# Patient Record
Sex: MALE | Race: WHITE | Employment: FULL TIME | URBAN - METROPOLITAN AREA
[De-identification: names, ages, dates, MRNs, and addresses within clinical notes are randomized per-mention and may not be internally consistent; named-entity substitution may affect disease eponyms.]

---

## 2017-05-25 ENCOUNTER — OFFICE VISIT (OUTPATIENT)
Dept: INTERNAL MEDICINE CLINIC | Facility: CLINIC | Age: 52
End: 2017-05-25

## 2017-05-25 VITALS
SYSTOLIC BLOOD PRESSURE: 119 MMHG | DIASTOLIC BLOOD PRESSURE: 83 MMHG | BODY MASS INDEX: 31 KG/M2 | TEMPERATURE: 98 F | HEART RATE: 87 BPM | WEIGHT: 193.63 LBS

## 2017-05-25 DIAGNOSIS — Z85.820 HISTORY OF MELANOMA: ICD-10-CM

## 2017-05-25 DIAGNOSIS — K21.9 GASTROESOPHAGEAL REFLUX DISEASE, ESOPHAGITIS PRESENCE NOT SPECIFIED: Primary | ICD-10-CM

## 2017-05-25 DIAGNOSIS — R22.2 MASS IN CHEST: ICD-10-CM

## 2017-05-25 PROCEDURE — 99212 OFFICE O/P EST SF 10 MIN: CPT | Performed by: INTERNAL MEDICINE

## 2017-05-25 PROCEDURE — 99214 OFFICE O/P EST MOD 30 MIN: CPT | Performed by: INTERNAL MEDICINE

## 2017-05-25 RX ORDER — LAMOTRIGINE 200 MG/1
800 TABLET ORAL DAILY
COMMUNITY
Start: 2017-05-19 | End: 2017-11-09

## 2017-05-25 NOTE — PROGRESS NOTES
HPI:    Patient ID: Jessica Gaines is a 54year old male. Pt presents to clinic today for a lump under the sternum and GERD. Pt has hx of skin cancer- melanoma when pt was 15 y/o. Pt last saw dermatologist 10 years ago. Pt has been taking multivitamins. atraumatic. Eyes: EOM are normal.   Neck: Normal range of motion. Cardiovascular: Normal rate, regular rhythm and normal heart sounds. Exam reveals no gallop and no friction rub. No murmur heard.   Pulmonary/Chest: Effort normal. No respiratory dist [id number]  By signing my name below, Yereld Maria Ines,  attest that this documentation has been prepared under the direction and in the presence of Sebastien Dawkins MD.   Electronically Signed: Emeli Woo, 5/25/2017, 4:42 PM.      Guy Ferguson MD,  p

## 2017-11-09 ENCOUNTER — LAB ENCOUNTER (OUTPATIENT)
Dept: LAB | Facility: HOSPITAL | Age: 52
End: 2017-11-09
Attending: Other
Payer: COMMERCIAL

## 2017-11-09 ENCOUNTER — OFFICE VISIT (OUTPATIENT)
Dept: NEUROLOGY | Facility: CLINIC | Age: 52
End: 2017-11-09

## 2017-11-09 VITALS
BODY MASS INDEX: 30.53 KG/M2 | HEIGHT: 66 IN | DIASTOLIC BLOOD PRESSURE: 70 MMHG | HEART RATE: 80 BPM | RESPIRATION RATE: 16 BRPM | SYSTOLIC BLOOD PRESSURE: 118 MMHG | WEIGHT: 190 LBS

## 2017-11-09 DIAGNOSIS — G40.909 SEIZURE DISORDER (HCC): Primary | ICD-10-CM

## 2017-11-09 DIAGNOSIS — G40.909 SEIZURE DISORDER (HCC): ICD-10-CM

## 2017-11-09 PROCEDURE — 99213 OFFICE O/P EST LOW 20 MIN: CPT | Performed by: OTHER

## 2017-11-09 PROCEDURE — 84450 TRANSFERASE (AST) (SGOT): CPT

## 2017-11-09 PROCEDURE — 80175 DRUG SCREEN QUAN LAMOTRIGINE: CPT

## 2017-11-09 PROCEDURE — 85025 COMPLETE CBC W/AUTO DIFF WBC: CPT

## 2017-11-09 PROCEDURE — 36415 COLL VENOUS BLD VENIPUNCTURE: CPT

## 2017-11-09 RX ORDER — LAMOTRIGINE 200 MG/1
400 TABLET ORAL 2 TIMES DAILY
Qty: 360 TABLET | Refills: 3 | Status: SHIPPED | OUTPATIENT
Start: 2017-11-09 | End: 2018-08-08

## 2017-11-09 RX ORDER — MULTIVIT-MIN/IRON FUM/FOLIC AC 7.5 MG-4
1 TABLET ORAL DAILY
COMMUNITY
End: 2019-10-04

## 2017-11-09 NOTE — PROGRESS NOTES
Melanie Rai : 1965     HPI:   Patient presents with:  Seizures: LOV: 16. Patient is here for a f/u on seizures. Pt states he has been doing ok. Pt states most recent seizure was 2017.  Pt is currently taking Lamotrigine 200 mg 4 tabs bari History:  Social History    Marital status:              Spouse name:                       Years of education:                 Number of children:               Social History Main Topics    Smoking status: Never Smoker without atrophy. Motor: 5/5 strength in the upper and lower extremities. Tone normal. No pronator drift . Coordination: Finger to nose, heel to shin intact bilaterally. Gait: Narrow based, negative Romberg’s sign. Can stand on heels and toes.

## 2017-11-20 ENCOUNTER — TELEPHONE (OUTPATIENT)
Dept: NEUROLOGY | Facility: CLINIC | Age: 52
End: 2017-11-20

## 2018-08-06 ENCOUNTER — NURSE TRIAGE (OUTPATIENT)
Dept: OTHER | Age: 53
End: 2018-08-06

## 2018-08-06 ENCOUNTER — OFFICE VISIT (OUTPATIENT)
Dept: FAMILY MEDICINE CLINIC | Facility: CLINIC | Age: 53
End: 2018-08-06

## 2018-08-06 ENCOUNTER — OFFICE VISIT (OUTPATIENT)
Dept: INTERNAL MEDICINE CLINIC | Facility: CLINIC | Age: 53
End: 2018-08-06
Payer: MEDICAID

## 2018-08-06 VITALS
WEIGHT: 198 LBS | HEIGHT: 66 IN | HEART RATE: 78 BPM | DIASTOLIC BLOOD PRESSURE: 85 MMHG | SYSTOLIC BLOOD PRESSURE: 120 MMHG | RESPIRATION RATE: 12 BRPM | BODY MASS INDEX: 31.82 KG/M2 | TEMPERATURE: 96 F

## 2018-08-06 DIAGNOSIS — S56.911A MUSCLE STRAIN OF RIGHT FOREARM, INITIAL ENCOUNTER: ICD-10-CM

## 2018-08-06 DIAGNOSIS — J06.9 VIRAL UPPER RESPIRATORY TRACT INFECTION: Primary | ICD-10-CM

## 2018-08-06 DIAGNOSIS — Z02.9 ENCOUNTERS FOR ADMINISTRATIVE PURPOSE: Primary | ICD-10-CM

## 2018-08-06 PROCEDURE — 99214 OFFICE O/P EST MOD 30 MIN: CPT | Performed by: INTERNAL MEDICINE

## 2018-08-06 PROCEDURE — 99212 OFFICE O/P EST SF 10 MIN: CPT | Performed by: INTERNAL MEDICINE

## 2018-08-06 RX ORDER — LAMOTRIGINE 200 MG/1
2 TABLET ORAL 2 TIMES DAILY
Refills: 1 | COMMUNITY
Start: 2018-07-23 | End: 2018-11-07

## 2018-08-06 NOTE — PROGRESS NOTES
HPI:    Patient ID: Mynor Castellano is a 50year old male. Headache    This is a new problem. The current episode started today. The problem occurs intermittently. The problem has been unchanged.  The pain is located in the bilateral, parietal and occipit and vomiting. Musculoskeletal: Positive for myalgias and stiffness. Skin: Negative for rash. Neurological: Positive for headaches. Negative for syncope, weakness and numbness.             Current Outpatient Prescriptions:  lamoTRIgine 200 MG Oral Tab and no deformity. Right hand: Normal.   Lymphadenopathy:     He has no cervical adenopathy. Neurological: He is alert. He has normal reflexes. He displays normal reflexes. No cranial nerve deficit. He exhibits normal muscle tone.  Coordination norm

## 2018-08-06 NOTE — PROGRESS NOTES
Sherin Morgan is a 50year old male who presents to [facility] with c/o headache (he feels is tension-related), intermittent blurred vision, and RT forearm pain which he feels is unrelated.   Pt reports all symptoms began this morning and he needed to leave work

## 2018-08-06 NOTE — TELEPHONE ENCOUNTER
Action Requested: Summary for Provider     []  Critical Lab, Recommendations Needed  [] Need Additional Advice  []   FYI    []   Need Orders  [] Need Medications Sent to Pharmacy  []  Other     SUMMARY: appt made today with Dr. Madina Garcia. ADO.     Patient

## 2018-08-08 RX ORDER — LAMOTRIGINE 200 MG/1
400 TABLET ORAL 2 TIMES DAILY
Qty: 360 TABLET | Refills: 0 | Status: SHIPPED | OUTPATIENT
Start: 2018-08-08 | End: 2018-11-06

## 2018-08-08 NOTE — TELEPHONE ENCOUNTER
Refill request for lamotrigine 200 mg, 2 po BID, #360, no refills    LOV: 11/9/17  NOV: 9/13/18 with Dr. Eun Weldon

## 2018-11-03 ENCOUNTER — NURSE TRIAGE (OUTPATIENT)
Dept: INTERNAL MEDICINE CLINIC | Facility: CLINIC | Age: 53
End: 2018-11-03

## 2018-11-03 ENCOUNTER — HOSPITAL ENCOUNTER (OUTPATIENT)
Age: 53
Discharge: HOME | End: 2018-11-03
Attending: EMERGENCY MEDICINE
Payer: COMMERCIAL

## 2018-11-03 ENCOUNTER — APPOINTMENT (OUTPATIENT)
Dept: GENERAL RADIOLOGY | Age: 53
End: 2018-11-03
Attending: EMERGENCY MEDICINE
Payer: COMMERCIAL

## 2018-11-03 VITALS
HEART RATE: 80 BPM | SYSTOLIC BLOOD PRESSURE: 124 MMHG | RESPIRATION RATE: 16 BRPM | WEIGHT: 185 LBS | DIASTOLIC BLOOD PRESSURE: 79 MMHG | BODY MASS INDEX: 30 KG/M2 | TEMPERATURE: 99 F | OXYGEN SATURATION: 100 %

## 2018-11-03 DIAGNOSIS — G56.01 CARPAL TUNNEL SYNDROME OF RIGHT WRIST: Primary | ICD-10-CM

## 2018-11-03 PROCEDURE — 99213 OFFICE O/P EST LOW 20 MIN: CPT

## 2018-11-03 PROCEDURE — 72040 X-RAY EXAM NECK SPINE 2-3 VW: CPT | Performed by: EMERGENCY MEDICINE

## 2018-11-03 PROCEDURE — 99203 OFFICE O/P NEW LOW 30 MIN: CPT

## 2018-11-03 NOTE — TELEPHONE ENCOUNTER
Action Requested: Summary for Provider     []  Critical Lab, Recommendations Needed  [] Need Additional Advice  []   FYI    []   Need Orders  [] Need Medications Sent to Pharmacy  []  Other     SUMMARY: Dr Madina Garcia, patient c/o numbness in the fingers an

## 2018-11-03 NOTE — ED PROVIDER NOTES
Patient Seen in: [facility] In [address] [place] [address]    History   Patient presents with:  Upper Extremity Injury (musculoskeletal)    Stated Complaint: r arm tingling    HPI    Patient is a 45-year-old male who presents to the urgent care with a Eyes: Conjunctivae and EOM are normal. Pupils are equal, round, and reactive to light. Neck: Neck supple. Cardiovascular: Normal rate, regular rhythm and normal heart sounds. Pulmonary/Chest: Effort normal.   Musculoskeletal: Normal range of motion.

## 2018-11-03 NOTE — TELEPHONE ENCOUNTER
Patient contacted, relayed Dr Elvira Umanzor message, offered [facility] today 9-4:30 or appt later this week.  Patient chose to go to IC

## 2018-11-03 NOTE — ED NOTES
velcro splint applied to wrist. Motrin or tylenol for pain an ds welling list of hand provided and to guido and make appointment-

## 2018-11-03 NOTE — ED INITIAL ASSESSMENT (HPI)
Tingling in rt arm for weeks and now in past 24 hours peterson side of hand and wrist are numb. No trauma no brain injury. Does have seizures from trauma fall as a child.

## 2018-11-05 NOTE — TELEPHONE ENCOUNTER
Pt seen at [facility] 11/3/18 with recommendations to see Dr Luisana Lima as f/u. CSS please contact pt to schedule f/u OV.

## 2018-11-07 ENCOUNTER — OFFICE VISIT (OUTPATIENT)
Dept: NEUROLOGY | Facility: CLINIC | Age: 53
End: 2018-11-07
Payer: COMMERCIAL

## 2018-11-07 ENCOUNTER — APPOINTMENT (OUTPATIENT)
Dept: LAB | Facility: HOSPITAL | Age: 53
End: 2018-11-07
Attending: Other
Payer: COMMERCIAL

## 2018-11-07 VITALS
BODY MASS INDEX: 29.73 KG/M2 | SYSTOLIC BLOOD PRESSURE: 108 MMHG | DIASTOLIC BLOOD PRESSURE: 88 MMHG | HEART RATE: 95 BPM | HEIGHT: 66 IN | WEIGHT: 185 LBS

## 2018-11-07 DIAGNOSIS — G40.909 SEIZURE DISORDER (HCC): Primary | ICD-10-CM

## 2018-11-07 DIAGNOSIS — G40.909 SEIZURE DISORDER (HCC): ICD-10-CM

## 2018-11-07 PROCEDURE — 85025 COMPLETE CBC W/AUTO DIFF WBC: CPT | Performed by: OTHER

## 2018-11-07 PROCEDURE — 84450 TRANSFERASE (AST) (SGOT): CPT

## 2018-11-07 PROCEDURE — 84460 ALANINE AMINO (ALT) (SGPT): CPT

## 2018-11-07 PROCEDURE — 99214 OFFICE O/P EST MOD 30 MIN: CPT | Performed by: OTHER

## 2018-11-07 PROCEDURE — 36415 COLL VENOUS BLD VENIPUNCTURE: CPT | Performed by: OTHER

## 2018-11-07 RX ORDER — LANSOPRAZOLE 30 MG/1
30 CAPSULE, DELAYED RELEASE ORAL DAILY
COMMUNITY
End: 2020-05-15

## 2018-11-07 RX ORDER — LAMOTRIGINE 200 MG/1
200 TABLET ORAL 2 TIMES DAILY
Qty: 360 TABLET | Refills: 3 | Status: SHIPPED | OUTPATIENT
Start: 2018-11-07 | End: 2019-11-14

## 2018-11-07 NOTE — PROGRESS NOTES
The pleasure assuming the neurological care of Luis Munoz. He had a partial complex seizure in March. He states he has 1-2 partial complex seizures per year. Blood tests, Lamictal level last year reviewed. He works as a  in office building.   He was re

## 2018-11-09 NOTE — TELEPHONE ENCOUNTER
MICHAEL message    Spoke to pt and stts that he was advised by urgent care to see a hand specialist instead of PCP and he is already scheduled with the specialist. (Routing comment)

## 2019-01-17 ENCOUNTER — TELEPHONE (OUTPATIENT)
Dept: NEUROLOGY | Facility: CLINIC | Age: 54
End: 2019-01-17

## 2019-04-03 NOTE — H&P
[facility] - Gastroenterology                                                                                                  Clinic History and Physical 1 tablet (200 mg total) by mouth 2 (two) times daily. Disp: 360 tablet Rfl: 3   Multiple Vitamins-Minerals (MULTI-VITAMIN/MINERALS) Oral Tab Take 1 tablet by mouth daily.  Disp:  Rfl:      Allergies:  No Known Allergies    ROS:   all 10 systems were reviewe proceed with colonoscopy with intervention [i.e. polypectomy, control of bleeding, dilatation, etc.] as indicated. Orders This Visit:  No orders of the defined types were placed in this encounter.       Meds This Visit:  Requested Prescriptions      No p

## 2019-04-04 ENCOUNTER — OFFICE VISIT (OUTPATIENT)
Dept: GASTROENTEROLOGY | Facility: CLINIC | Age: 54
End: 2019-04-04
Payer: MEDICAID

## 2019-04-04 VITALS
WEIGHT: 197.81 LBS | HEART RATE: 84 BPM | HEIGHT: 66 IN | DIASTOLIC BLOOD PRESSURE: 80 MMHG | SYSTOLIC BLOOD PRESSURE: 117 MMHG | BODY MASS INDEX: 31.79 KG/M2

## 2019-04-04 DIAGNOSIS — K21.9 GASTROESOPHAGEAL REFLUX DISEASE, ESOPHAGITIS PRESENCE NOT SPECIFIED: ICD-10-CM

## 2019-04-04 DIAGNOSIS — Z12.12 SCREENING FOR COLORECTAL CANCER: Primary | ICD-10-CM

## 2019-04-04 DIAGNOSIS — Z12.11 SCREENING FOR COLORECTAL CANCER: Primary | ICD-10-CM

## 2019-04-04 PROCEDURE — 99203 OFFICE O/P NEW LOW 30 MIN: CPT | Performed by: INTERNAL MEDICINE

## 2019-04-04 NOTE — PATIENT INSTRUCTIONS
1. Schedule colonoscopy with IV/conscious sedation at the hospital with Dr. Ankit Obrien    2.  bowel prep from pharmacy (split Suprep )    3. Continue all medications for procedure    4. Read all bowel prep instructions carefully    5.  AVOID seeds, nu

## 2019-04-05 ENCOUNTER — TELEPHONE (OUTPATIENT)
Dept: GASTROENTEROLOGY | Facility: CLINIC | Age: 54
End: 2019-04-05

## 2019-04-05 DIAGNOSIS — Z12.12 ENCOUNTER FOR COLORECTAL CANCER SCREENING: Primary | ICD-10-CM

## 2019-04-05 DIAGNOSIS — Z12.11 ENCOUNTER FOR COLORECTAL CANCER SCREENING: Primary | ICD-10-CM

## 2019-04-05 NOTE — TELEPHONE ENCOUNTER
Scheduled for:  Colonoscopy 77[address]  Provider Name:   Date:  4/22/19  Location:  [facility]  Sedation:  Ivcs  Time:  0815 / Arrival 0715   Prep: Split dose Trilyte or Suprep  Meds/Allergies Reconciled?:  Physician reviewed  Diagnosis with codes: Colorectal can

## 2019-04-16 ENCOUNTER — TELEPHONE (OUTPATIENT)
Dept: GASTROENTEROLOGY | Facility: CLINIC | Age: 54
End: 2019-04-16

## 2019-04-16 DIAGNOSIS — Z12.11 COLON CANCER SCREENING: Primary | ICD-10-CM

## 2019-04-16 NOTE — TELEPHONE ENCOUNTER
Rescheduled for:  Colonoscopy - [facility]  Provider Name:  Dr. Barbara Onofre  Date:  FROM - 4/22/19              TO - 5/20/19  Location:  [facility]  Sedation:  IV  Time:  FROM - 8:15 am         TO - 11:15 am (pt is aware to arrive at 10:15 am)  Prep:  Trilyte, Prep inst

## 2019-04-16 NOTE — TELEPHONE ENCOUNTER
Pt calling to reschedule CLN 4/22/19 do to going out of town. Pt aware of at least 72hr call back time.  Please call [phone]

## 2019-05-15 ENCOUNTER — TELEPHONE (OUTPATIENT)
Dept: GASTROENTEROLOGY | Facility: CLINIC | Age: 54
End: 2019-05-15

## 2019-05-15 DIAGNOSIS — Z12.11 COLON CANCER SCREENING: Primary | ICD-10-CM

## 2019-05-15 NOTE — TELEPHONE ENCOUNTER
Scheduled for:  Colonoscopy 47299  Provider Name: Dr. Travis Cuellar  Date:  7/22/19  Location:  [facility]  Sedation:  IV  Time:   0830 (pt is aware to arrive at 0730)   Prep:  Trilyte, sent new prep via [employer]  Meds/Allergies Reconciled?:  Physician reviewed   Diag

## 2019-05-15 NOTE — TELEPHONE ENCOUNTER
Patient canceling his colonoscopy due to a new work schedule. Patient would like to reschedule. Appointment cancelled in [place] [address]. Surgical case request sent to endo. CHELO message sent to Dr. Scout Shelton. Message sent to GI schedulers.

## 2019-07-19 ENCOUNTER — PATIENT MESSAGE (OUTPATIENT)
Dept: GASTROENTEROLOGY | Facility: CLINIC | Age: 54
End: 2019-07-19

## 2019-07-20 NOTE — PROGRESS NOTES
E-prescribed Dawit Grace MD  [facility] - Gastroenterology  7/20/2019  10:58 AM

## 2019-07-22 ENCOUNTER — HOSPITAL ENCOUNTER (OUTPATIENT)
Facility: HOSPITAL | Age: 54
Setting detail: HOSPITAL OUTPATIENT SURGERY
Discharge: HOME | End: 2019-07-22
Attending: INTERNAL MEDICINE | Admitting: INTERNAL MEDICINE
Payer: MEDICAID

## 2019-07-22 DIAGNOSIS — Z12.11 COLON CANCER SCREENING: ICD-10-CM

## 2019-07-22 PROCEDURE — 45385 COLONOSCOPY W/LESION REMOVAL: CPT | Performed by: INTERNAL MEDICINE

## 2019-07-22 PROCEDURE — G0500 MOD SEDAT ENDO SERVICE >5YRS: HCPCS | Performed by: INTERNAL MEDICINE

## 2019-07-22 PROCEDURE — 0DBN8ZX EXCISION OF SIGMOID COLON, VIA NATURAL OR ARTIFICIAL OPENING ENDOSCOPIC, DIAGNOSTIC: ICD-10-PCS | Performed by: INTERNAL MEDICINE

## 2019-07-22 RX ORDER — MIDAZOLAM HYDROCHLORIDE 1 MG/ML
1 INJECTION INTRAMUSCULAR; INTRAVENOUS EVERY 5 MIN PRN
Status: DISCONTINUED | OUTPATIENT
Start: 2019-07-22 | End: 2019-07-22

## 2019-07-22 RX ORDER — MIDAZOLAM HYDROCHLORIDE 1 MG/ML
INJECTION INTRAMUSCULAR; INTRAVENOUS
Status: DISCONTINUED | OUTPATIENT
Start: 2019-07-22 | End: 2019-07-22

## 2019-07-22 RX ORDER — SODIUM CHLORIDE, SODIUM LACTATE, POTASSIUM CHLORIDE, CALCIUM CHLORIDE 600; 310; 30; 20 MG/100ML; MG/100ML; MG/100ML; MG/100ML
INJECTION, SOLUTION INTRAVENOUS CONTINUOUS
Status: DISCONTINUED | OUTPATIENT
Start: 2019-07-22 | End: 2019-07-22

## 2019-07-22 RX ORDER — SODIUM CHLORIDE 0.9 % (FLUSH) 0.9 %
10 SYRINGE (ML) INJECTION AS NEEDED
Status: DISCONTINUED | OUTPATIENT
Start: 2019-07-22 | End: 2019-07-22

## 2019-07-22 NOTE — OPERATIVE REPORT
Colonoscopy Report    Benito Crowic     1965 Age 48year old   PCP Rosie Parr MD Endoscopist Philly Cordova MD     Date of procedure: 19    Procedure: Colonoscopy w/ snare polypectomy    Pre-operative diagnosis: colorectal ca was then withdrawn and the endoscope was removed. The patient tolerated the procedure well. There were no immediate postoperative complications. The patient’s vital signs were monitored throughout the procedure and remained stable.     Estimated blood loss:

## 2019-07-22 NOTE — TELEPHONE ENCOUNTER
From: Melani Avalos  To: Yehuda Rodriguez MD  Sent: 7/19/2019 9:31 PM CDT  Subject: Prescription Question    My colonoscopy scheduled for Monday I was wondering when the prescription is going to be called in to [facility]

## 2019-07-22 NOTE — H&P
History & Physical Examination    Patient Name: Kraig Mari  MRN: [id number]  CSN: [id number]  YOB: 1965    Diagnosis: colorectal cancer screening      Medications Prior to Admission:  lansoprazole 30 MG Oral Capsule Delayed Release Take AM

## 2019-07-23 ENCOUNTER — TELEPHONE (OUTPATIENT)
Dept: GASTROENTEROLOGY | Facility: CLINIC | Age: 54
End: 2019-07-23

## 2019-07-23 VITALS
HEART RATE: 59 BPM | SYSTOLIC BLOOD PRESSURE: 111 MMHG | DIASTOLIC BLOOD PRESSURE: 87 MMHG | BODY MASS INDEX: 28.13 KG/M2 | HEIGHT: 66 IN | WEIGHT: 175 LBS | RESPIRATION RATE: 21 BRPM | OXYGEN SATURATION: 100 %

## 2019-07-23 NOTE — TELEPHONE ENCOUNTER
I mailed out colonoscopy results letter to pt  Updated health maintenance  Entered into 10 yr CLN recall  Recall colon in 10 years per.  Colon done 7/22/19    GI staff: please place recall in for colonoscopy in 10 years

## 2019-08-09 ENCOUNTER — OFFICE VISIT (OUTPATIENT)
Dept: INTERNAL MEDICINE CLINIC | Facility: CLINIC | Age: 54
End: 2019-08-09
Payer: MEDICAID

## 2019-08-09 ENCOUNTER — HOSPITAL ENCOUNTER (OUTPATIENT)
Dept: GENERAL RADIOLOGY | Age: 54
Discharge: HOME | End: 2019-08-09
Attending: INTERNAL MEDICINE
Payer: MEDICAID

## 2019-08-09 ENCOUNTER — LAB ENCOUNTER (OUTPATIENT)
Dept: LAB | Age: 54
End: 2019-08-09
Attending: INTERNAL MEDICINE
Payer: MEDICAID

## 2019-08-09 VITALS
DIASTOLIC BLOOD PRESSURE: 70 MMHG | HEART RATE: 62 BPM | SYSTOLIC BLOOD PRESSURE: 106 MMHG | TEMPERATURE: 96 F | RESPIRATION RATE: 12 BRPM | WEIGHT: 192 LBS | HEIGHT: 65.5 IN | BODY MASS INDEX: 31.6 KG/M2

## 2019-08-09 DIAGNOSIS — M25.571 ACUTE RIGHT ANKLE PAIN: ICD-10-CM

## 2019-08-09 DIAGNOSIS — K21.9 GASTROESOPHAGEAL REFLUX DISEASE, ESOPHAGITIS PRESENCE NOT SPECIFIED: ICD-10-CM

## 2019-08-09 DIAGNOSIS — Z00.00 ANNUAL PHYSICAL EXAM: Primary | ICD-10-CM

## 2019-08-09 DIAGNOSIS — G40.909 SEIZURE DISORDER (HCC): ICD-10-CM

## 2019-08-09 DIAGNOSIS — Z85.820 HISTORY OF MELANOMA: ICD-10-CM

## 2019-08-09 DIAGNOSIS — Z00.00 ANNUAL PHYSICAL EXAM: ICD-10-CM

## 2019-08-09 PROBLEM — S56.911A MUSCLE STRAIN OF RIGHT FOREARM: Status: RESOLVED | Noted: 2018-08-06 | Resolved: 2019-08-09

## 2019-08-09 PROBLEM — J06.9 VIRAL UPPER RESPIRATORY TRACT INFECTION: Status: RESOLVED | Noted: 2018-08-06 | Resolved: 2019-08-09

## 2019-08-09 LAB
ALBUMIN SERPL-MCNC: 3.7 G/DL (ref 3.4–5)
ALBUMIN/GLOB SERPL: 1.2 {RATIO} (ref 1–2)
ALP LIVER SERPL-CCNC: 81 U/L (ref 45–117)
ALT SERPL-CCNC: 31 U/L (ref 16–61)
ANION GAP SERPL CALC-SCNC: 5 MMOL/L (ref 0–18)
AST SERPL-CCNC: 23 U/L (ref 15–37)
BASOPHILS # BLD AUTO: 0.03 X10(3) UL (ref 0–0.2)
BASOPHILS NFR BLD AUTO: 0.7 %
BILIRUB SERPL-MCNC: 0.7 MG/DL (ref 0.1–2)
BUN BLD-MCNC: 14 MG/DL (ref 7–18)
BUN/CREAT SERPL: 15.4 (ref 10–20)
CALCIUM BLD-MCNC: 9 MG/DL (ref 8.5–10.1)
CHLORIDE SERPL-SCNC: 109 MMOL/L (ref 98–112)
CHOLEST SMN-MCNC: 167 MG/DL (ref ?–200)
CO2 SERPL-SCNC: 28 MMOL/L (ref 21–32)
COMPLEXED PSA SERPL-MCNC: 0.92 NG/ML (ref ?–4)
CREAT BLD-MCNC: 0.91 MG/DL (ref 0.7–1.3)
DEPRECATED RDW RBC AUTO: 43.8 FL (ref 35.1–46.3)
EOSINOPHIL # BLD AUTO: 0.15 X10(3) UL (ref 0–0.7)
EOSINOPHIL NFR BLD AUTO: 3.6 %
ERYTHROCYTE [DISTWIDTH] IN BLOOD BY AUTOMATED COUNT: 12.9 % (ref 11–15)
GLOBULIN PLAS-MCNC: 3.2 G/DL (ref 2.8–4.4)
GLUCOSE BLD-MCNC: 93 MG/DL (ref 70–99)
HCT VFR BLD AUTO: 43.7 % (ref 39–53)
HDLC SERPL-MCNC: 47 MG/DL (ref 40–59)
HGB BLD-MCNC: 14.7 G/DL (ref 13–17.5)
IMM GRANULOCYTES # BLD AUTO: 0.01 X10(3) UL (ref 0–1)
IMM GRANULOCYTES NFR BLD: 0.2 %
LDLC SERPL CALC-MCNC: 106 MG/DL (ref ?–100)
LYMPHOCYTES # BLD AUTO: 1.5 X10(3) UL (ref 1–4)
LYMPHOCYTES NFR BLD AUTO: 36.3 %
M PROTEIN MFR SERPL ELPH: 6.9 G/DL (ref 6.4–8.2)
MCH RBC QN AUTO: 31.2 PG (ref 26–34)
MCHC RBC AUTO-ENTMCNC: 33.6 G/DL (ref 31–37)
MCV RBC AUTO: 92.8 FL (ref 80–100)
MONOCYTES # BLD AUTO: 0.42 X10(3) UL (ref 0.1–1)
MONOCYTES NFR BLD AUTO: 10.2 %
NEUTROPHILS # BLD AUTO: 2.02 X10 (3) UL (ref 1.5–7.7)
NEUTROPHILS # BLD AUTO: 2.02 X10(3) UL (ref 1.5–7.7)
NEUTROPHILS NFR BLD AUTO: 49 %
NONHDLC SERPL-MCNC: 120 MG/DL (ref ?–130)
OSMOLALITY SERPL CALC.SUM OF ELEC: 294 MOSM/KG (ref 275–295)
PATIENT FASTING: YES
PATIENT FASTING: YES
PLATELET # BLD AUTO: 207 10(3)UL (ref 150–450)
POTASSIUM SERPL-SCNC: 4.4 MMOL/L (ref 3.5–5.1)
RBC # BLD AUTO: 4.71 X10(6)UL (ref 4.3–5.7)
SODIUM SERPL-SCNC: 142 MMOL/L (ref 136–145)
TRIGL SERPL-MCNC: 72 MG/DL (ref 30–149)
VLDLC SERPL CALC-MCNC: 14 MG/DL (ref 0–30)
WBC # BLD AUTO: 4.1 X10(3) UL (ref 4–11)

## 2019-08-09 PROCEDURE — 85025 COMPLETE CBC W/AUTO DIFF WBC: CPT

## 2019-08-09 PROCEDURE — 80053 COMPREHEN METABOLIC PANEL: CPT

## 2019-08-09 PROCEDURE — 80061 LIPID PANEL: CPT

## 2019-08-09 PROCEDURE — 36415 COLL VENOUS BLD VENIPUNCTURE: CPT

## 2019-08-09 PROCEDURE — 73610 X-RAY EXAM OF ANKLE: CPT | Performed by: INTERNAL MEDICINE

## 2019-08-09 PROCEDURE — 99396 PREV VISIT EST AGE 40-64: CPT | Performed by: INTERNAL MEDICINE

## 2019-08-09 NOTE — PROGRESS NOTES
HPI:    Patient ID: Fiona Mac is a 48year old male. Patient presents today for his annual physical.     Ankle Pain    The incident occurred more than 1 week ago (3 mos ago rolled his right ankle). The injury mechanism was an inversion injury.  Ramiro Montemayor discharge. Left eye exhibits no discharge. No scleral icterus. Neck: Normal range of motion. Neck supple. No JVD present. No thyromegaly present. Cardiovascular: Normal rate, regular rhythm, normal heart sounds and intact distal pulses.  Exam reveals no (Screening) [E]      Meds This Visit:  Requested Prescriptions      No prescriptions requested or ordered in this encounter       Imaging & Referrals:  DERM - INTERNAL  XR ANKLE (2 VIEW), RIGHT (CPT=73600)       #[id number]

## 2019-08-12 ENCOUNTER — TELEPHONE (OUTPATIENT)
Dept: INTERNAL MEDICINE CLINIC | Facility: CLINIC | Age: 54
End: 2019-08-12

## 2019-08-12 DIAGNOSIS — M25.571 ACUTE RIGHT ANKLE PAIN: Primary | ICD-10-CM

## 2019-10-04 ENCOUNTER — OFFICE VISIT (OUTPATIENT)
Dept: DERMATOLOGY CLINIC | Facility: CLINIC | Age: 54
End: 2019-10-04
Payer: MEDICAID

## 2019-10-04 DIAGNOSIS — Z85.820 PERSONAL HISTORY OF MALIGNANT MELANOMA OF SKIN: ICD-10-CM

## 2019-10-04 DIAGNOSIS — D22.9 MULTIPLE NEVI: Primary | ICD-10-CM

## 2019-10-04 DIAGNOSIS — D23.60 BENIGN NEOPLASM OF SKIN OF UPPER LIMB, INCLUDING SHOULDER, UNSPECIFIED LATERALITY: ICD-10-CM

## 2019-10-04 DIAGNOSIS — D23.30 BENIGN NEOPLASM OF SKIN OF FACE: ICD-10-CM

## 2019-10-04 DIAGNOSIS — D23.70 BENIGN NEOPLASM OF SKIN OF LOWER LIMB, INCLUDING HIP, UNSPECIFIED LATERALITY: ICD-10-CM

## 2019-10-04 DIAGNOSIS — D23.4 BENIGN NEOPLASM OF SCALP AND SKIN OF NECK: ICD-10-CM

## 2019-10-04 DIAGNOSIS — D23.5 BENIGN NEOPLASM OF SKIN OF TRUNK, EXCEPT SCROTUM: ICD-10-CM

## 2019-10-04 PROCEDURE — 99203 OFFICE O/P NEW LOW 30 MIN: CPT | Performed by: DERMATOLOGY

## 2019-10-14 NOTE — PROGRESS NOTES
Kraig Mari is a 48year old male. HPI:     CC:  Patient presents with:  Full Skin Exam: New pt presenting for full body skin exam. Pt has a personal hx of melanoma to L 5th finger (when pt was 14years old).          Allergies:  Patient has no known SURGERY UNLISTED      LRF tendon repair     Social History    Socioeconomic History      Marital status:       Spouse name: Not on file      Number of children: Not on file      Years of education: Not on file      Highest education level: Not on fi Self-Exams: Not Asked        Grew up on a farm: Not Asked        History of tanning: Not Asked        Outdoor occupation: Not Asked        Reaction to local anesthetic: No    Social History Narrative      Not on file    Family History   Problem Relation Ag scattered, cherry-red vascular papules scattered. See map today's date for lesions noted . Otherwise remarkable for lesions as noted on map.   See details of examination  See Assessment /Plan for additional history and physical exam also:    Assessm follow-up/ above. This note was generated using Dragon voice recognition software. Please contact me regarding any confusion resulting from errors in recognition.

## 2019-11-14 ENCOUNTER — OFFICE VISIT (OUTPATIENT)
Dept: NEUROLOGY | Facility: CLINIC | Age: 54
End: 2019-11-14
Payer: MEDICAID

## 2019-11-14 ENCOUNTER — APPOINTMENT (OUTPATIENT)
Dept: LAB | Age: 54
End: 2019-11-14
Attending: Other
Payer: MEDICAID

## 2019-11-14 VITALS
HEIGHT: 66 IN | SYSTOLIC BLOOD PRESSURE: 118 MMHG | RESPIRATION RATE: 16 BRPM | DIASTOLIC BLOOD PRESSURE: 72 MMHG | WEIGHT: 171 LBS | HEART RATE: 66 BPM | BODY MASS INDEX: 27.48 KG/M2

## 2019-11-14 DIAGNOSIS — R56.9 SEIZURE (HCC): Primary | ICD-10-CM

## 2019-11-14 DIAGNOSIS — R56.9 SEIZURE (HCC): ICD-10-CM

## 2019-11-14 PROCEDURE — 80175 DRUG SCREEN QUAN LAMOTRIGINE: CPT

## 2019-11-14 PROCEDURE — 99213 OFFICE O/P EST LOW 20 MIN: CPT | Performed by: OTHER

## 2019-11-14 PROCEDURE — 36415 COLL VENOUS BLD VENIPUNCTURE: CPT

## 2019-11-14 RX ORDER — LAMOTRIGINE 200 MG/1
200 TABLET ORAL 2 TIMES DAILY
Qty: 360 TABLET | Refills: 3 | Status: SHIPPED | OUTPATIENT
Start: 2019-11-14 | End: 2019-11-14

## 2019-11-14 RX ORDER — LAMOTRIGINE 200 MG/1
400 TABLET ORAL 2 TIMES DAILY
Qty: 360 TABLET | Refills: 3 | Status: SHIPPED | OUTPATIENT
Start: 2019-11-14 | End: 2019-11-22

## 2019-11-14 NOTE — PROGRESS NOTES
Mr Magdalene Hernandez, relates since August he has been having to 3 partial complex seizures, spacing out for several seconds per week. No secondary generalized tonic-clonic seizure. Prior to August she was having moderate to partial complex seizures per month.   He

## 2020-04-03 ENCOUNTER — TELEPHONE (OUTPATIENT)
Dept: INTERNAL MEDICINE CLINIC | Facility: CLINIC | Age: 55
End: 2020-04-03

## 2020-04-03 ENCOUNTER — NURSE TRIAGE (OUTPATIENT)
Dept: OTHER | Age: 55
End: 2020-04-03

## 2020-04-03 ENCOUNTER — OFFICE VISIT (OUTPATIENT)
Dept: INTERNAL MEDICINE CLINIC | Facility: CLINIC | Age: 55
End: 2020-04-03
Payer: MEDICAID

## 2020-04-03 VITALS
SYSTOLIC BLOOD PRESSURE: 129 MMHG | WEIGHT: 186 LBS | BODY MASS INDEX: 29.89 KG/M2 | HEART RATE: 75 BPM | DIASTOLIC BLOOD PRESSURE: 78 MMHG | HEIGHT: 66 IN | TEMPERATURE: 98 F

## 2020-04-03 DIAGNOSIS — N50.811 RIGHT TESTICULAR PAIN: Primary | ICD-10-CM

## 2020-04-03 PROCEDURE — 81003 URINALYSIS AUTO W/O SCOPE: CPT | Performed by: INTERNAL MEDICINE

## 2020-04-03 PROCEDURE — 99214 OFFICE O/P EST MOD 30 MIN: CPT | Performed by: INTERNAL MEDICINE

## 2020-04-03 NOTE — TELEPHONE ENCOUNTER
Action Requested: Summary for Provider     []  Critical Lab, Recommendations Needed  [x] Need Additional Advice  []   FYI    []   Need Orders  [] Need Medications Sent to Pharmacy  []  Other     SUMMARY:      Does patient need to be seen or have  telephone

## 2020-04-03 NOTE — TELEPHONE ENCOUNTER
S/W patient. Travel Screen and Covid-19 questions asked. Patient denies any travel this year in or out of the country. Denies cough, sore throat, fever, and/or any difficulty breathing.  Related Dr. Maria Fernanda Orozco message that he will see the patient today at

## 2020-04-03 NOTE — PROGRESS NOTES
HPI:    Patient ID: Fiona Mac is a 48year old male. Penis/Scrotum Problem   The patient's pertinent negatives include no penile discharge, penile pain, scrotal swelling or testicular pain. Primary symptoms comment: right testicular pain.  This is cooperative. Non-toxic appearance. He does not have a sickly appearance. He does not appear ill. No distress. HENT:   Head: Normocephalic and atraumatic.    Right Ear: External ear normal.   Left Ear: External ear normal.   Nose: Nose normal.   Mouth/Thr pain  (primary encounter diagnosis)  Plan: URINALYSIS, AUTO, W/O SCOPE        Genital/abdominal exam was normal. No tenderness on the testicles to suggest epididymitis nor orchitis.   We did do urine dip and showed no pyuria but moderate blood; no nitrite;

## 2020-04-03 NOTE — TELEPHONE ENCOUNTER
Bianka Goldman LPN called from Dr Booker Giang office and states she will contact pt and take care of below in a different message that she can type in.

## 2020-04-06 ENCOUNTER — TELEPHONE (OUTPATIENT)
Dept: INTERNAL MEDICINE CLINIC | Facility: CLINIC | Age: 55
End: 2020-04-06

## 2020-04-06 DIAGNOSIS — R31.29 MICROHEMATURIA: Primary | ICD-10-CM

## 2020-04-07 ENCOUNTER — TELEPHONE (OUTPATIENT)
Dept: OTHER | Age: 55
End: 2020-04-07

## 2020-04-07 NOTE — TELEPHONE ENCOUNTER
Verified name and . Results/recommendations reviewed with pt and pt verb understanding. Pt states will retrieve referral information from my chart.      Notes recorded by Charley Dunham MD on 2020 at 10:33 AM CDT  Notify pt; his urine cultur

## 2020-04-08 ENCOUNTER — TELEPHONE (OUTPATIENT)
Dept: SURGERY | Facility: CLINIC | Age: 55
End: 2020-04-08

## 2020-04-08 NOTE — TELEPHONE ENCOUNTER
Spoke with Patient Alberto Melgar, Mike's wife adv that due to the COVID-19 Crisis and CDC Recommendations we would  to assist in rescheduling his appointment.    She agreed to reschedule his appointment to 05/22/20

## 2020-05-14 ENCOUNTER — PATIENT MESSAGE (OUTPATIENT)
Dept: SURGERY | Facility: CLINIC | Age: 55
End: 2020-05-14

## 2020-05-14 NOTE — TELEPHONE ENCOUNTER
From: Benito Waters  To: Kevan Hill MD  Sent: 5/14/2020 10:14 AM CDT  Subject: Non-Urgent Medical Question    Ican do the video vist. I just wanted to let you know that once in a while my urine is brown and I don't know what that means.

## 2020-05-14 NOTE — TELEPHONE ENCOUNTER
RN called in response to his call that he is ok to have a video visit with MD.     Patient denies pain. Stream is strong. Denies frequency. No burning sensation. Patient is a new consult for urine discoloration.  Reports brown urine, rt testicular pain once

## 2020-05-14 NOTE — TELEPHONE ENCOUNTER
LMTCB    Also sent my chart message. Due to the COVID-19 pandemic, and the CDC recommendations we are reaching out to patient to see if he would like to reschedule appointment to a video visit.  If patient agrees to video visit, please assist in changin

## 2020-05-15 ENCOUNTER — HOSPITAL ENCOUNTER (OUTPATIENT)
Age: 55
Discharge: ANOTHER HOSPITAL | End: 2020-05-15
Attending: EMERGENCY MEDICINE
Payer: MEDICAID

## 2020-05-15 ENCOUNTER — HOSPITAL ENCOUNTER (EMERGENCY)
Facility: HOSPITAL | Age: 55
Discharge: HOME | End: 2020-05-15
Attending: EMERGENCY MEDICINE
Payer: MEDICAID

## 2020-05-15 ENCOUNTER — APPOINTMENT (OUTPATIENT)
Dept: CT IMAGING | Facility: HOSPITAL | Age: 55
End: 2020-05-15
Attending: EMERGENCY MEDICINE
Payer: MEDICAID

## 2020-05-15 VITALS
RESPIRATION RATE: 16 BRPM | BODY MASS INDEX: 28.93 KG/M2 | DIASTOLIC BLOOD PRESSURE: 96 MMHG | OXYGEN SATURATION: 100 % | SYSTOLIC BLOOD PRESSURE: 133 MMHG | WEIGHT: 180 LBS | HEIGHT: 66 IN | HEART RATE: 70 BPM | TEMPERATURE: 98 F

## 2020-05-15 VITALS
HEART RATE: 75 BPM | TEMPERATURE: 98 F | WEIGHT: 180 LBS | DIASTOLIC BLOOD PRESSURE: 94 MMHG | SYSTOLIC BLOOD PRESSURE: 131 MMHG | OXYGEN SATURATION: 99 % | BODY MASS INDEX: 29 KG/M2 | RESPIRATION RATE: 16 BRPM

## 2020-05-15 DIAGNOSIS — N20.0 KIDNEY STONE: Primary | ICD-10-CM

## 2020-05-15 DIAGNOSIS — R10.9 RIGHT SIDED ABDOMINAL PAIN: Primary | ICD-10-CM

## 2020-05-15 DIAGNOSIS — R31.9 HEMATURIA, UNSPECIFIED TYPE: ICD-10-CM

## 2020-05-15 PROCEDURE — 80048 BASIC METABOLIC PNL TOTAL CA: CPT | Performed by: EMERGENCY MEDICINE

## 2020-05-15 PROCEDURE — 81001 URINALYSIS AUTO W/SCOPE: CPT | Performed by: EMERGENCY MEDICINE

## 2020-05-15 PROCEDURE — 87086 URINE CULTURE/COLONY COUNT: CPT | Performed by: EMERGENCY MEDICINE

## 2020-05-15 PROCEDURE — 99213 OFFICE O/P EST LOW 20 MIN: CPT

## 2020-05-15 PROCEDURE — 36415 COLL VENOUS BLD VENIPUNCTURE: CPT

## 2020-05-15 PROCEDURE — 76376 3D RENDER W/INTRP POSTPROCES: CPT | Performed by: EMERGENCY MEDICINE

## 2020-05-15 PROCEDURE — 74178 CT ABD&PLV WO CNTR FLWD CNTR: CPT | Performed by: EMERGENCY MEDICINE

## 2020-05-15 PROCEDURE — 99284 EMERGENCY DEPT VISIT MOD MDM: CPT

## 2020-05-15 PROCEDURE — 85025 COMPLETE CBC W/AUTO DIFF WBC: CPT | Performed by: EMERGENCY MEDICINE

## 2020-05-15 PROCEDURE — 99214 OFFICE O/P EST MOD 30 MIN: CPT

## 2020-05-15 PROCEDURE — 81002 URINALYSIS NONAUTO W/O SCOPE: CPT

## 2020-05-15 RX ORDER — HYDROCODONE BITARTRATE AND ACETAMINOPHEN 5; 325 MG/1; MG/1
1-2 TABLET ORAL EVERY 6 HOURS PRN
Qty: 6 TABLET | Refills: 0 | Status: SHIPPED | OUTPATIENT
Start: 2020-05-15 | End: 2020-07-21

## 2020-05-15 RX ORDER — OMEPRAZOLE 20 MG/1
20 CAPSULE, DELAYED RELEASE ORAL
COMMUNITY

## 2020-05-15 NOTE — ED PROVIDER NOTES
Patient Seen in: [facility] Emergency Department      History   Patient presents with:  Abdomen/Flank Pain    Stated Complaint: abd pain, hematuria    HPI    48year old male with h/o melanoma in remission, gerd and approx 6 weeks of intermittent range of motion and neck supple. Cardiovascular:      Rate and Rhythm: Normal rate and regular rhythm. Heart sounds: Normal heart sounds. No murmur. Pulmonary:      Effort: Pulmonary effort is normal. No respiratory distress.       Breath sounds: N ------                     CBC W/ DIFFERENTIAL[[id number]]          Abnormal            Final result                 Please view results for these tests on the individual orders.    RAINBOW DRAW BLUE   RAINBOW DRAW LAVENDER   RAINBOW DRAW LIGHT GREEN   RAIN but not limited to fever, change in uop, or increased pain. Pt will call Dr Jerome Mcburney office today or Mon to schedule close f/u appt.   Medications   iopamidol (ISOVUE-M) 76 % injection 100 mL (80 mL Intravenous Given 5/15/20 1032)             Disposition and

## 2020-05-15 NOTE — ED PROVIDER NOTES
Patient Seen in: [facility] In [address] [place] [address]      History   Patient presents with:  Abdominal Pain    Stated Complaint: Rt Abd Pain    HPI  Patient is a 61-year-old male who presents to immediate care with intense right-sided abdominal p equivalent per week    Drug use: No             Review of Systems   Constitutional: Negative for activity change, appetite change, chills and fever. HENT: Negative. Respiratory: Negative. Gastrointestinal: Positive for abdominal pain.  Negative for Mental Status: He is alert.                ED Course     Labs Reviewed   [facility] POCT URINALYSIS DIPSTICK - Abnormal; Notable for the following components:       Result Value    Urine Clarity Cloudy (*)     Protein urine Trace (*)     Blood, Urine Large Obadiah Current

## 2020-05-15 NOTE — ED INITIAL ASSESSMENT (HPI)
Right abdominal pain that woke patient up at 0400 this morning  \"sharp, constant\" pain  Pt with a history of brown colored urine that he saw his PMD for on 4/3 and has an appt with urologist on 5/22/20.    Pt with a history of kidney stone 10 years ago th

## 2020-05-18 ENCOUNTER — TELEPHONE (OUTPATIENT)
Dept: SURGERY | Facility: CLINIC | Age: 55
End: 2020-05-18

## 2020-05-18 DIAGNOSIS — N20.0 RIGHT KIDNEY STONE: Primary | ICD-10-CM

## 2020-05-18 NOTE — TELEPHONE ENCOUNTER
Patient was in the emergency room over the weekend with right-sided 9 mm obstructing ureteral stone. Please call patient to get an assessment on how he is feeling. The patient needs to complete a KUB to determine if the stone is radiopaque.   Please set t

## 2020-05-19 NOTE — TELEPHONE ENCOUNTER
Spoke with patient regarding MD's instructions. Patient verbalized understanding. Patient states he is not having pain. Patient states he is not able to do video visit tomorrow. Would like to keep video visit for Friday, will complete KUB before visit.

## 2020-05-20 ENCOUNTER — HOSPITAL ENCOUNTER (OUTPATIENT)
Dept: GENERAL RADIOLOGY | Age: 55
Discharge: HOME | End: 2020-05-20
Attending: UROLOGY
Payer: MEDICAID

## 2020-05-20 DIAGNOSIS — N20.0 RIGHT KIDNEY STONE: ICD-10-CM

## 2020-05-20 PROCEDURE — 74019 RADEX ABDOMEN 2 VIEWS: CPT | Performed by: UROLOGY

## 2020-05-22 ENCOUNTER — TELEMEDICINE (OUTPATIENT)
Dept: SURGERY | Facility: CLINIC | Age: 55
End: 2020-05-22

## 2020-05-22 ENCOUNTER — TELEPHONE (OUTPATIENT)
Dept: SURGERY | Facility: CLINIC | Age: 55
End: 2020-05-22

## 2020-05-22 DIAGNOSIS — N20.0 RIGHT KIDNEY STONE: Primary | ICD-10-CM

## 2020-05-22 PROCEDURE — 99243 OFF/OP CNSLTJ NEW/EST LOW 30: CPT | Performed by: UROLOGY

## 2020-05-22 RX ORDER — TAMSULOSIN HYDROCHLORIDE 0.4 MG/1
0.4 CAPSULE ORAL DAILY
Qty: 30 CAPSULE | Refills: 0 | Status: SHIPPED | OUTPATIENT
Start: 2020-05-22 | End: 2020-06-21

## 2020-05-22 NOTE — PROGRESS NOTES
SUBJECTIVE:  Nayeli Roman is a 48year old male who presents for a consultation at the request of, and a copy of this note will be sent to, Dr. Emani Almaraz, for evaluation of  urinary stone. He states that the problem is much improved.  Symptoms include or indigestion, abdominal pains, bloody or tarry stools. GENERAL: Denies:  weight gain, weight loss, fever, night sweats, bone pain, malaise and fatigue. Positive for:  None. OBJECTIVE:  There were no vitals taken for this visit.   He appears well, in the provider-patient relationship, due to the ongoing public health crisis/national emergency and because of restrictions of visitation.  There are limitations of this visit as no physical exam could be performed.  Every conscious effort was taken to allow

## 2020-05-26 ENCOUNTER — TELEPHONE (OUTPATIENT)
Dept: SURGERY | Facility: CLINIC | Age: 55
End: 2020-05-26

## 2020-05-26 RX ORDER — HYDROCODONE BITARTRATE AND ACETAMINOPHEN 5; 325 MG/1; MG/1
TABLET ORAL
Qty: 30 TABLET | Refills: 0 | Status: SHIPPED | OUTPATIENT
Start: 2020-05-26 | End: 2020-07-21

## 2020-05-26 NOTE — TELEPHONE ENCOUNTER
S/W pt and informed him that [facility] [place] sent a refill of the Norco to his Viktoria Rogers and I am also mailing him a strainer today.

## 2020-05-26 NOTE — TELEPHONE ENCOUNTER
S/W pt and determined that he is taking Advil now for his Rt flank pain which is at level 5 now.  States that he needs a refill of Jasper because he used all 6 tabs on Friday night because his pain was so severe he was feeling that he might have to go to the

## 2020-05-29 ENCOUNTER — PATIENT MESSAGE (OUTPATIENT)
Dept: SURGERY | Facility: CLINIC | Age: 55
End: 2020-05-29

## 2020-06-09 NOTE — TELEPHONE ENCOUNTER
From: Amy Rodriguez  To: Bre Hedrick MD  Sent: 5/29/2020 1:52 PM CDT  Subject: Visit Follow-up Question    Hi,    Im following up to give you status on how Im feeling, there has been no change in how I am feeling.  I still get the pain the stomach and

## 2020-06-21 ENCOUNTER — PATIENT MESSAGE (OUTPATIENT)
Dept: SURGERY | Facility: CLINIC | Age: 55
End: 2020-06-21

## 2020-06-23 ENCOUNTER — TELEPHONE (OUTPATIENT)
Dept: SURGERY | Facility: CLINIC | Age: 55
End: 2020-06-23

## 2020-06-23 NOTE — TELEPHONE ENCOUNTER
RN replied to this patient via [employer] in response to his questions, Liliana Batista now finished the medicine you gave me what should I  do now ? \" See RN's reply below: \"This is Jona Díaz, one of the nurse of Dr Jaylen Moyer.  If you are still having on-off pain, you

## 2020-06-23 NOTE — TELEPHONE ENCOUNTER
From: Julianna Come  To: Siena Solorio MD  Sent: 6/21/2020 11:20 PM CDT  Subject: Non-Urgent Medical Question    Good morning,   I have now finished the medicine you gave me what should I do now ?    Joe Tran

## 2020-06-24 NOTE — TELEPHONE ENCOUNTER
RN called patient back. Wife answered. Said, that patient was inquiring about the Tamsulosin, not Tabor.     Per wife, he does not have anymore pain and no issue with BM. RN explained that Tamsulosin was only good for 30 days. No need for refill.  It was pr

## 2020-07-21 ENCOUNTER — HOSPITAL ENCOUNTER (OUTPATIENT)
Age: 55
Discharge: HOME | End: 2020-07-21
Attending: EMERGENCY MEDICINE
Payer: MEDICAID

## 2020-07-21 VITALS
WEIGHT: 180 LBS | TEMPERATURE: 98 F | OXYGEN SATURATION: 98 % | HEART RATE: 81 BPM | DIASTOLIC BLOOD PRESSURE: 74 MMHG | SYSTOLIC BLOOD PRESSURE: 120 MMHG | BODY MASS INDEX: 28.93 KG/M2 | HEIGHT: 66 IN | RESPIRATION RATE: 18 BRPM

## 2020-07-21 DIAGNOSIS — S39.012A STRAIN OF LUMBAR REGION, INITIAL ENCOUNTER: Primary | ICD-10-CM

## 2020-07-21 PROCEDURE — 99203 OFFICE O/P NEW LOW 30 MIN: CPT | Performed by: EMERGENCY MEDICINE

## 2020-07-21 RX ORDER — CYCLOBENZAPRINE HCL 10 MG
10 TABLET ORAL 3 TIMES DAILY PRN
Qty: 20 TABLET | Refills: 0 | Status: SHIPPED | OUTPATIENT
Start: 2020-07-21 | End: 2020-07-28

## 2020-07-21 RX ORDER — TRAMADOL HYDROCHLORIDE 50 MG/1
50 TABLET ORAL EVERY 6 HOURS PRN
Qty: 10 TABLET | Refills: 0 | Status: SHIPPED | OUTPATIENT
Start: 2020-07-21 | End: 2020-07-28

## 2020-07-21 NOTE — ED NOTES
Discharge inst and s/e reviewed of meds told to f/u with pcp in office for possible p/t and MRI/CT if not improving.  Ambulatory home

## 2020-07-21 NOTE — ED PROVIDER NOTES
Patient Seen in: [facility] In [address] [place] [address]      History   Patient presents with:  Back Pain    Stated Complaint: BACK INJURY    HPI      Patient is a healthy 51-year-old male who presents to immediate care after a work-related injury i Musculoskeletal: Positive for back pain. Negative for arthralgias, gait problem, joint swelling, myalgias and neck pain. Skin: Negative for rash and wound. Neurological: Negative for dizziness, weakness, light-headedness, numbness and headaches. ED Course   Labs Reviewed - No data to display               MDM                 Disposition and Plan     Clinical Impression:  Strain of lumbar region, initial encounter  (primary encounter diagnosis)    Disposition:  Discharge  7/21/2020  2:13 pm    Foll

## 2020-07-31 ENCOUNTER — OFFICE VISIT (OUTPATIENT)
Dept: INTERNAL MEDICINE CLINIC | Facility: CLINIC | Age: 55
End: 2020-07-31
Payer: MEDICAID

## 2020-07-31 VITALS
RESPIRATION RATE: 12 BRPM | BODY MASS INDEX: 31.18 KG/M2 | TEMPERATURE: 97 F | WEIGHT: 194 LBS | SYSTOLIC BLOOD PRESSURE: 129 MMHG | HEART RATE: 94 BPM | HEIGHT: 66 IN | DIASTOLIC BLOOD PRESSURE: 64 MMHG

## 2020-07-31 DIAGNOSIS — S39.012A BACK STRAIN, INITIAL ENCOUNTER: Primary | ICD-10-CM

## 2020-07-31 PROCEDURE — 99214 OFFICE O/P EST MOD 30 MIN: CPT | Performed by: INTERNAL MEDICINE

## 2020-07-31 PROCEDURE — 3074F SYST BP LT 130 MM HG: CPT | Performed by: INTERNAL MEDICINE

## 2020-07-31 PROCEDURE — 3078F DIAST BP <80 MM HG: CPT | Performed by: INTERNAL MEDICINE

## 2020-07-31 PROCEDURE — 3008F BODY MASS INDEX DOCD: CPT | Performed by: INTERNAL MEDICINE

## 2020-07-31 NOTE — PROGRESS NOTES
HPI:    Patient ID: Julianna Jensen is a 48year old male. Back Pain   This is a new problem. The current episode started 1 to 4 weeks ago. The problem occurs intermittently. The problem has been waxing and waning since onset.  The pain is present in th Cardiovascular: Normal rate, regular rhythm and normal heart sounds. No murmur heard. Pulmonary/Chest: Effort normal and breath sounds normal. No respiratory distress. He has no wheezes. He has no rales.    Musculoskeletal:      Cervical back: Normal.

## 2020-08-09 ENCOUNTER — PATIENT MESSAGE (OUTPATIENT)
Dept: SURGERY | Facility: CLINIC | Age: 55
End: 2020-08-09

## 2020-08-19 NOTE — TELEPHONE ENCOUNTER
Below is patient's message sent via [employer]:   From: Raudel Christianson  To: Harpal Guidry MD  Sent: 8/9/2020  8:56 PM CDT  Subject: Non-Urgent Medical Question    Good morning Dr Kay Ochoa,     This is Brian Green, i am writing you because I am going to ne

## 2020-10-16 ENCOUNTER — LAB ENCOUNTER (OUTPATIENT)
Dept: LAB | Age: 55
End: 2020-10-16
Attending: INTERNAL MEDICINE
Payer: MEDICAID

## 2020-10-16 ENCOUNTER — OFFICE VISIT (OUTPATIENT)
Dept: INTERNAL MEDICINE CLINIC | Facility: CLINIC | Age: 55
End: 2020-10-16
Payer: MEDICAID

## 2020-10-16 VITALS
HEART RATE: 73 BPM | SYSTOLIC BLOOD PRESSURE: 111 MMHG | WEIGHT: 184 LBS | DIASTOLIC BLOOD PRESSURE: 75 MMHG | TEMPERATURE: 97 F | HEIGHT: 66 IN | BODY MASS INDEX: 29.57 KG/M2 | RESPIRATION RATE: 12 BRPM

## 2020-10-16 DIAGNOSIS — G89.29 CHRONIC PAIN OF BOTH KNEES: ICD-10-CM

## 2020-10-16 DIAGNOSIS — Z85.820 HISTORY OF MELANOMA: ICD-10-CM

## 2020-10-16 DIAGNOSIS — Z23 IMMUNIZATION DUE: ICD-10-CM

## 2020-10-16 DIAGNOSIS — G40.909 SEIZURE DISORDER (HCC): ICD-10-CM

## 2020-10-16 DIAGNOSIS — M25.562 CHRONIC PAIN OF BOTH KNEES: ICD-10-CM

## 2020-10-16 DIAGNOSIS — Z00.00 ANNUAL PHYSICAL EXAM: ICD-10-CM

## 2020-10-16 DIAGNOSIS — M25.561 CHRONIC PAIN OF BOTH KNEES: ICD-10-CM

## 2020-10-16 DIAGNOSIS — K21.9 GASTROESOPHAGEAL REFLUX DISEASE, UNSPECIFIED WHETHER ESOPHAGITIS PRESENT: ICD-10-CM

## 2020-10-16 DIAGNOSIS — Z00.00 ANNUAL PHYSICAL EXAM: Primary | ICD-10-CM

## 2020-10-16 PROBLEM — M25.571 ACUTE RIGHT ANKLE PAIN: Status: RESOLVED | Noted: 2019-08-09 | Resolved: 2020-10-16

## 2020-10-16 PROCEDURE — 80053 COMPREHEN METABOLIC PANEL: CPT

## 2020-10-16 PROCEDURE — 85025 COMPLETE CBC W/AUTO DIFF WBC: CPT

## 2020-10-16 PROCEDURE — 3078F DIAST BP <80 MM HG: CPT | Performed by: INTERNAL MEDICINE

## 2020-10-16 PROCEDURE — 3074F SYST BP LT 130 MM HG: CPT | Performed by: INTERNAL MEDICINE

## 2020-10-16 PROCEDURE — 99396 PREV VISIT EST AGE 40-64: CPT | Performed by: INTERNAL MEDICINE

## 2020-10-16 PROCEDURE — 3008F BODY MASS INDEX DOCD: CPT | Performed by: INTERNAL MEDICINE

## 2020-10-16 PROCEDURE — 90686 IIV4 VACC NO PRSV 0.5 ML IM: CPT | Performed by: INTERNAL MEDICINE

## 2020-10-16 PROCEDURE — 36415 COLL VENOUS BLD VENIPUNCTURE: CPT

## 2020-10-16 PROCEDURE — 90471 IMMUNIZATION ADMIN: CPT | Performed by: INTERNAL MEDICINE

## 2020-10-16 PROCEDURE — 80061 LIPID PANEL: CPT

## 2020-10-16 NOTE — PROGRESS NOTES
HPI:    Patient ID: Wendy Vilchis is a 50year old male. Patient presents for his annual physical.       Review of Systems   Constitutional: Negative. HENT: Negative. Eyes: Negative. Respiratory: Negative. Cardiovascular: Negative.   Nevin Aguirre person, place, and time. He appears well-developed and well-nourished. No distress. HENT:   Head: Normocephalic and atraumatic.    Right Ear: External ear normal.   Left Ear: External ear normal.   Nose: Nose normal.   Mouth/Throat: Oropharynx is clear an Derm.    (M25.561,  M25.562,  G89.29) Chronic pain of both knees  Plan: ORTHOPEDIC - INTERNAL        Patient request to see orthopedist.  Referral given.    (K21.9) Gastroesophageal reflux disease, unspecified whether esophagitis present  Plan: Patient has

## 2020-10-18 ENCOUNTER — TELEPHONE (OUTPATIENT)
Dept: INTERNAL MEDICINE CLINIC | Facility: CLINIC | Age: 55
End: 2020-10-18

## 2020-10-18 DIAGNOSIS — R79.89 ELEVATED LFTS: Primary | ICD-10-CM

## 2020-10-30 ENCOUNTER — HOSPITAL ENCOUNTER (OUTPATIENT)
Dept: GENERAL RADIOLOGY | Facility: HOSPITAL | Age: 55
Discharge: HOME | End: 2020-10-30
Attending: ORTHOPAEDIC SURGERY
Payer: MEDICAID

## 2020-10-30 ENCOUNTER — OFFICE VISIT (OUTPATIENT)
Dept: ORTHOPEDICS CLINIC | Facility: CLINIC | Age: 55
End: 2020-10-30
Payer: MEDICAID

## 2020-10-30 VITALS
BODY MASS INDEX: 29.57 KG/M2 | WEIGHT: 184 LBS | RESPIRATION RATE: 20 BRPM | DIASTOLIC BLOOD PRESSURE: 66 MMHG | HEART RATE: 70 BPM | SYSTOLIC BLOOD PRESSURE: 109 MMHG | HEIGHT: 66 IN

## 2020-10-30 DIAGNOSIS — M25.561 PAIN IN BOTH KNEES, UNSPECIFIED CHRONICITY: ICD-10-CM

## 2020-10-30 DIAGNOSIS — M25.562 PAIN IN BOTH KNEES, UNSPECIFIED CHRONICITY: ICD-10-CM

## 2020-10-30 DIAGNOSIS — M22.41 PATELLOFEMORAL CHONDROSIS OF RIGHT KNEE: Primary | ICD-10-CM

## 2020-10-30 DIAGNOSIS — M22.42 PATELLOFEMORAL CHONDROSIS OF LEFT KNEE: ICD-10-CM

## 2020-10-30 PROCEDURE — 3078F DIAST BP <80 MM HG: CPT | Performed by: ORTHOPAEDIC SURGERY

## 2020-10-30 PROCEDURE — 3074F SYST BP LT 130 MM HG: CPT | Performed by: ORTHOPAEDIC SURGERY

## 2020-10-30 PROCEDURE — 3008F BODY MASS INDEX DOCD: CPT | Performed by: ORTHOPAEDIC SURGERY

## 2020-10-30 PROCEDURE — 73562 X-RAY EXAM OF KNEE 3: CPT | Performed by: ORTHOPAEDIC SURGERY

## 2020-10-30 PROCEDURE — 99244 OFF/OP CNSLTJ NEW/EST MOD 40: CPT | Performed by: ORTHOPAEDIC SURGERY

## 2020-10-30 NOTE — PROGRESS NOTES
NURSING INTAKE COMMENTS: Patient presents with:  Knee Pain: Bilateral - R is worse than L - onset 6 mo ago - no injury - rates pain as 4/10 on and off - no numbness or tingling -       HPI: This 50year old male presents today with complaints of knee pain. status: Never Smoker      Smokeless tobacco: Never Used    Substance and Sexual Activity      Alcohol use:  Yes        Alcohol/week: 5.0 standard drinks        Types: 5 Standard drinks or equivalent per week      Drug use: No      Sexual activity: Not on fi bilaterally at 30 degrees and at full extension. Wesly's sign negative bilaterally. Medial lateral patellar glides are 1.5 quadrants. There is no apprehension on patellar testing. No pain with passive range of motion of the hips.   Neurological: Ligh

## 2020-11-12 ENCOUNTER — OFFICE VISIT (OUTPATIENT)
Dept: NEUROLOGY | Facility: CLINIC | Age: 55
End: 2020-11-12
Payer: MEDICAID

## 2020-11-12 VITALS — HEIGHT: 66 IN | WEIGHT: 186 LBS | BODY MASS INDEX: 29.89 KG/M2

## 2020-11-12 DIAGNOSIS — R56.9 SEIZURE (HCC): Primary | ICD-10-CM

## 2020-11-12 PROCEDURE — 3008F BODY MASS INDEX DOCD: CPT | Performed by: OTHER

## 2020-11-12 PROCEDURE — 99213 OFFICE O/P EST LOW 20 MIN: CPT | Performed by: OTHER

## 2020-11-12 RX ORDER — LAMOTRIGINE 200 MG/1
TABLET ORAL
Qty: 270 TABLET | Refills: 3 | Status: SHIPPED | OUTPATIENT
Start: 2020-11-12 | End: 2021-12-15

## 2020-11-12 NOTE — PROGRESS NOTES
Mr Marielle Aguiar, relates that he has had 20 seizures over the last year. He describes what may be partial complex seizures. No tonic-clonic seizure. The last about a minute. He states he stares off in space. He states his wife has observed some episodes.   H

## 2020-12-04 ENCOUNTER — OFFICE VISIT (OUTPATIENT)
Dept: PHYSICAL THERAPY | Age: 55
End: 2020-12-04
Attending: ORTHOPAEDIC SURGERY
Payer: MEDICAID

## 2020-12-04 DIAGNOSIS — M22.41 PATELLOFEMORAL CHONDROSIS OF RIGHT KNEE: ICD-10-CM

## 2020-12-04 DIAGNOSIS — M22.42 PATELLOFEMORAL CHONDROSIS OF LEFT KNEE: ICD-10-CM

## 2020-12-04 PROCEDURE — 97161 PT EVAL LOW COMPLEX 20 MIN: CPT

## 2020-12-04 PROCEDURE — 97110 THERAPEUTIC EXERCISES: CPT

## 2020-12-04 NOTE — PROGRESS NOTES
P.T. EVALUATION:   Referring Physician: Dr. Mau Mac  Diagnosis: Patellofemoral chondrosis of left knee (M22.42)  Patellofemoral chondrosis of right knee (M22.41)     Date of Onset: June 2020 Date of Service: 12/4/2020     PATIENT SUMMARY   Georgia Carole noted    Today’s Treatment and Response:  Patient education provided on PT eval findings, treatment plan, goals, HEP  Patient received there ex, HEP  Charges: PT Eval, TE 1      Total Timed Treatment: 45 min     Total Treatment Time: 45 min      PT Eval Lo

## 2020-12-11 ENCOUNTER — OFFICE VISIT (OUTPATIENT)
Dept: PHYSICAL THERAPY | Age: 55
End: 2020-12-11
Attending: ORTHOPAEDIC SURGERY
Payer: MEDICAID

## 2020-12-11 PROCEDURE — 97110 THERAPEUTIC EXERCISES: CPT

## 2020-12-11 NOTE — PROGRESS NOTES
Diagnosis: Patellofemoral chondrosis of left knee (M22.42)  Patellofemoral chondrosis of right knee (M22.41)     Date of Onset: June 2020        Next MD visit: none scheduled  Fall Risk: standard         Precautions: n/a          Medication Changes since l Treatment: 45 min  Total Treatment Time: 45 min

## 2020-12-18 ENCOUNTER — TELEPHONE (OUTPATIENT)
Dept: INTERNAL MEDICINE CLINIC | Facility: CLINIC | Age: 55
End: 2020-12-18

## 2020-12-18 NOTE — TELEPHONE ENCOUNTER
Reviewed repeat labs ordered by Dr Luisana Lima due to liver enzymes, patient confirms will schedule appt to have labs done.

## 2020-12-21 ENCOUNTER — LAB ENCOUNTER (OUTPATIENT)
Dept: LAB | Age: 55
End: 2020-12-21
Attending: INTERNAL MEDICINE
Payer: MEDICAID

## 2020-12-21 DIAGNOSIS — R79.89 ELEVATED LFTS: ICD-10-CM

## 2020-12-21 PROCEDURE — 86704 HEP B CORE ANTIBODY TOTAL: CPT

## 2020-12-21 PROCEDURE — 87340 HEPATITIS B SURFACE AG IA: CPT

## 2020-12-21 PROCEDURE — 80076 HEPATIC FUNCTION PANEL: CPT

## 2020-12-21 PROCEDURE — 86803 HEPATITIS C AB TEST: CPT

## 2020-12-21 PROCEDURE — 36415 COLL VENOUS BLD VENIPUNCTURE: CPT

## 2020-12-21 PROCEDURE — 86706 HEP B SURFACE ANTIBODY: CPT

## 2021-01-07 ENCOUNTER — HOSPITAL ENCOUNTER (OUTPATIENT)
Dept: GENERAL RADIOLOGY | Facility: HOSPITAL | Age: 56
Discharge: HOME | End: 2021-01-07
Attending: ORTHOPAEDIC SURGERY
Payer: MEDICAID

## 2021-01-07 ENCOUNTER — OFFICE VISIT (OUTPATIENT)
Dept: ORTHOPEDICS CLINIC | Facility: CLINIC | Age: 56
End: 2021-01-07
Payer: MEDICAID

## 2021-01-07 VITALS — WEIGHT: 180 LBS | HEIGHT: 66 IN | BODY MASS INDEX: 28.93 KG/M2

## 2021-01-07 DIAGNOSIS — M25.512 BILATERAL SHOULDER PAIN, UNSPECIFIED CHRONICITY: ICD-10-CM

## 2021-01-07 DIAGNOSIS — M75.81 TENDINITIS OF BOTH ROTATOR CUFFS: Primary | ICD-10-CM

## 2021-01-07 DIAGNOSIS — M25.511 BILATERAL SHOULDER PAIN, UNSPECIFIED CHRONICITY: ICD-10-CM

## 2021-01-07 DIAGNOSIS — M75.82 TENDINITIS OF BOTH ROTATOR CUFFS: Primary | ICD-10-CM

## 2021-01-07 PROCEDURE — 73030 X-RAY EXAM OF SHOULDER: CPT | Performed by: ORTHOPAEDIC SURGERY

## 2021-01-07 PROCEDURE — 99214 OFFICE O/P EST MOD 30 MIN: CPT | Performed by: ORTHOPAEDIC SURGERY

## 2021-01-07 PROCEDURE — 3008F BODY MASS INDEX DOCD: CPT | Performed by: ORTHOPAEDIC SURGERY

## 2021-01-07 NOTE — PROGRESS NOTES
NURSING INTAKE COMMENTS: Patient presents with:  Shoulder Pain: biltateral -- No XR. Onset 1.5 mths. Denies any injury or fall. Left shoulder is worse. Rates pain 7/10 in left shoulderr and 3/10 in right shoulder. Right handed.        HPI: This 50year old History    Occupational History      Not on file    Tobacco Use      Smoking status: Never Smoker      Smokeless tobacco: Never Used    Substance and Sexual Activity      Alcohol use:  Yes        Alcohol/week: 5.0 standard drinks        Types: 5 Standard dr mild pain. Right shoulder active forward flexion 160 degrees. External rotation 70 degrees. Internal rotation 80 degrees without pain. Abduction external rotation belly press strength are 5-5 bilaterally.   Byrd impingement sign mildly positive on th TECHNIQUE: 3 views were obtained. FINDINGS:  BONES: No acute fracture or dislocation is apparent. The acromioclavicular and glenohumeral joints are congruent. SOFT TISSUES: Negative for visible soft tissue swelling or radiopaque foreign body.   EFFUSION:

## 2021-01-08 ENCOUNTER — OFFICE VISIT (OUTPATIENT)
Dept: PHYSICAL THERAPY | Age: 56
End: 2021-01-08
Attending: ORTHOPAEDIC SURGERY
Payer: MEDICAID

## 2021-01-08 PROCEDURE — 97110 THERAPEUTIC EXERCISES: CPT

## 2021-01-08 NOTE — PROGRESS NOTES
Diagnosis: Patellofemoral chondrosis of left knee (M22.42)  Patellofemoral chondrosis of right knee (M22.41)     Date of Onset: June 2020        Next MD visit: none scheduled  Fall Risk: standard         Precautions: n/a          Medication Changes since l global hip and quad strengthening exercises this session.     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

## 2021-01-15 ENCOUNTER — OFFICE VISIT (OUTPATIENT)
Dept: PHYSICAL THERAPY | Age: 56
End: 2021-01-15
Attending: ORTHOPAEDIC SURGERY
Payer: MEDICAID

## 2021-01-15 PROCEDURE — 97110 THERAPEUTIC EXERCISES: CPT

## 2021-01-15 NOTE — PROGRESS NOTES
Diagnosis: Patellofemoral chondrosis of left knee (M22.42)  Patellofemoral chondrosis of right knee (M22.41)     Date of Onset: June 2020        Next MD visit: none scheduled  Fall Risk: standard         Precautions: n/a          Medication Changes since l hamstring/gastroc stretch with sports strap 3 x 30 seconds each  - supine R/L SLR 10\" holds x 10 each   - supine R/L SLR with IR/ER 5\" holds x 10 each  - bridging 2 x 10   - standing R/L hip abduction/extension with RTB above knee 2 x 10 each   - assess

## 2021-01-21 ENCOUNTER — OFFICE VISIT (OUTPATIENT)
Dept: PHYSICAL THERAPY | Age: 56
End: 2021-01-21
Attending: ORTHOPAEDIC SURGERY
Payer: MEDICAID

## 2021-01-21 PROCEDURE — 97110 THERAPEUTIC EXERCISES: CPT

## 2021-01-21 NOTE — PROGRESS NOTES
Physical Therapy Discharge Summary    Diagnosis: Patellofemoral chondrosis of left knee (M22.42)  Patellofemoral chondrosis of right knee (M22.41)     Date of Onset: June 2020        Next MD visit: none scheduled  Fall Risk: standard         Precautions: n sports strap 3 x 30 seconds each  - supine R/L SLR with 1.5# 3x10 ea  - sidelying L hip abduction with RTB above knees with forward and backwards taps 1 x 10 ea  - bridging with GTB above knees 2x10  - bridging with GTB above knees with march 1x10 ea  - sh

## 2021-01-22 ENCOUNTER — APPOINTMENT (OUTPATIENT)
Dept: PHYSICAL THERAPY | Age: 56
End: 2021-01-22
Attending: ORTHOPAEDIC SURGERY
Payer: MEDICAID

## 2021-01-22 ENCOUNTER — OFFICE VISIT (OUTPATIENT)
Dept: PHYSICAL THERAPY | Age: 56
End: 2021-01-22
Attending: ORTHOPAEDIC SURGERY
Payer: MEDICAID

## 2021-01-22 DIAGNOSIS — M75.81 TENDINITIS OF BOTH ROTATOR CUFFS: ICD-10-CM

## 2021-01-22 DIAGNOSIS — M75.82 TENDINITIS OF BOTH ROTATOR CUFFS: ICD-10-CM

## 2021-01-22 PROCEDURE — 97110 THERAPEUTIC EXERCISES: CPT

## 2021-01-22 PROCEDURE — 97161 PT EVAL LOW COMPLEX 20 MIN: CPT

## 2021-01-22 NOTE — PROGRESS NOTES
P.T. EVALUATION:   Referring Physician: Dr. Burke Barrios  Diagnosis: Tendinitis of both rotator cuffs (M75.81,M75.82)    Date of Onset: November 2020 Date of Service: 1/22/2021     PATIENT SUMMARY   Shilpa Gary is a 50year old y/o male who presents to t (supine):  Flx: R 155 deg, L 142 deg (pain)  Abd: R 170 deg, L 170 deg   ER: R 90 deg, L 75 deg  IR: R 90 deg, L 90 deg    Accessory motion:   GH mobility:  AP assessment: R normal, L normal  Inferior glide assessment: R normal, L normal    Strength/MMT:

## 2021-01-29 ENCOUNTER — OFFICE VISIT (OUTPATIENT)
Dept: PHYSICAL THERAPY | Age: 56
End: 2021-01-29
Attending: ORTHOPAEDIC SURGERY
Payer: MEDICAID

## 2021-01-29 PROCEDURE — 97110 THERAPEUTIC EXERCISES: CPT

## 2021-02-04 ENCOUNTER — OFFICE VISIT (OUTPATIENT)
Dept: PHYSICAL THERAPY | Age: 56
End: 2021-02-04
Attending: ORTHOPAEDIC SURGERY
Payer: MEDICAID

## 2021-02-04 PROCEDURE — 97110 THERAPEUTIC EXERCISES: CPT

## 2021-02-04 NOTE — PROGRESS NOTES
Diagnosis: Tendinitis of both rotator cuffs (M75.81,M75.82)    Date of Onset: November 2020        Next MD visit: none scheduled  Fall Risk: standard         Precautions: n/a          Medication Changes since last visit?: No    Subjective: Pt reports 0/10 degrees flexion x 1 minute each   - sidelying R/L shoulder ER 2# DBs 2 x 10 each   - sidelying R/L shoulder scaption 2# DBs 2 x 10 each   - prone B scap squeeze 5\" holds 2 x 10   - prone B shoulder extension 2 x 10   - prone B mid trap 2 x 10    - corner

## 2021-02-09 ENCOUNTER — TELEPHONE (OUTPATIENT)
Dept: PHYSICAL THERAPY | Facility: HOSPITAL | Age: 56
End: 2021-02-09

## 2021-02-11 ENCOUNTER — OFFICE VISIT (OUTPATIENT)
Dept: PHYSICAL THERAPY | Age: 56
End: 2021-02-11
Attending: ORTHOPAEDIC SURGERY
Payer: MEDICAID

## 2021-02-11 PROCEDURE — 97110 THERAPEUTIC EXERCISES: CPT

## 2021-02-11 NOTE — PROGRESS NOTES
Diagnosis: Tendinitis of both rotator cuffs (M75.81,M75.82)    Date of Onset: November 2020        Next MD visit: none scheduled  Fall Risk: standard         Precautions: n/a          Medication Changes since last visit?: No    Subjective: Pt reports incre ER 2# DBs 2 x 10 each   - sidelying R/L shoulder scaption 2# DBs 2 x 10 each   - prone B scap squeeze 5\" holds 2 x 10 (verbal and tactile cues required)  - prone B shoulder extension 2 x 10   - prone B mid trap 2 x 10   - standing B narrow high row with G

## 2021-02-17 ENCOUNTER — TELEPHONE (OUTPATIENT)
Dept: PHYSICAL THERAPY | Facility: HOSPITAL | Age: 56
End: 2021-02-17

## 2021-02-18 ENCOUNTER — APPOINTMENT (OUTPATIENT)
Dept: PHYSICAL THERAPY | Age: 56
End: 2021-02-18
Attending: ORTHOPAEDIC SURGERY
Payer: MEDICAID

## 2021-02-25 ENCOUNTER — OFFICE VISIT (OUTPATIENT)
Dept: PHYSICAL THERAPY | Age: 56
End: 2021-02-25
Attending: ORTHOPAEDIC SURGERY
Payer: MEDICAID

## 2021-02-25 PROCEDURE — 97110 THERAPEUTIC EXERCISES: CPT

## 2021-02-25 NOTE — PROGRESS NOTES
Diagnosis: Tendinitis of both rotator cuffs (M75.81,M75.82)    Date of Onset: November 2020        Next MD visit: none scheduled  Fall Risk: standard         Precautions: n/a          Medication Changes since last visit?: No    Subjective: Patient reports shoulder extension with wand 25x  - standing B narrow mid scap row with GSC 3 x 10  - standing B shoulder extension with [address] 3 x 10  - standing R/L shoulder ER with RTB 2x10 ea  - standing R/L shoulder IR with GTB 2x10 ea  - standing B shoulder flexion wal

## 2021-03-04 ENCOUNTER — OFFICE VISIT (OUTPATIENT)
Dept: PHYSICAL THERAPY | Age: 56
End: 2021-03-04
Attending: ORTHOPAEDIC SURGERY
Payer: MEDICAID

## 2021-03-04 PROCEDURE — 97110 THERAPEUTIC EXERCISES: CPT

## 2021-03-04 NOTE — PROGRESS NOTES
Diagnosis: Tendinitis of both rotator cuffs (M75.81,M75.82)    Date of Onset: November 2020        Next MD visit: none scheduled  Fall Risk: standard         Precautions: n/a          Medication Changes since last visit?: No    Subjective: Patient reports and strength x 5 minutes   - sidelying R/L shoulder ER 2# DB 3 x 10   - sidelying R/L shoulder scaption 2# DB 2 x 10   - prone B shoulder extension palms down 3 x 10   - prone B mid trap 3 x 10   - prone B horizontal abduction with ER 2 x 10   - standing B

## 2021-03-11 ENCOUNTER — OFFICE VISIT (OUTPATIENT)
Dept: PHYSICAL THERAPY | Age: 56
End: 2021-03-11
Attending: ORTHOPAEDIC SURGERY
Payer: MEDICAID

## 2021-03-11 PROCEDURE — 97110 THERAPEUTIC EXERCISES: CPT

## 2021-03-11 NOTE — PROGRESS NOTES
Diagnosis: Tendinitis of both rotator cuffs (M75.81,M75.82)    Date of Onset: November 2020        Next MD visit: none scheduled  Fall Risk: standard         Precautions: n/a          Medication Changes since last visit?: No    Subjective: Patient reports DB 3 x 10   - sidelying R/L shoulder scaption 2# DB 3 x 10   - prone B shoulder extension palms down 3 x 10   - prone B mid trap 3 x 10   - prone B horizontal abduction with ER 3 x 10  - prone \"W\" squeeze 2 x 10    - standing B narrow high row with GSC x with patient discharge with updated HEP in next 1-3 visits pending any setbacks.      Charges: EX 3       Total Timed Treatment: 45 min  Total Treatment Time: 45 min

## 2021-03-18 ENCOUNTER — OFFICE VISIT (OUTPATIENT)
Dept: PHYSICAL THERAPY | Age: 56
End: 2021-03-18
Attending: ORTHOPAEDIC SURGERY
Payer: MEDICAID

## 2021-03-18 PROCEDURE — 97110 THERAPEUTIC EXERCISES: CPT

## 2021-03-18 NOTE — PROGRESS NOTES
Diagnosis: Tendinitis of both rotator cuffs (M75.81,M75.82)    Date of Onset: November 2020        Next MD visit: none scheduled  Fall Risk: standard         Precautions: n/a          Medication Changes since last visit?: No    Subjective: Patient reports prone B mid trap 3 x 10   - prone B horizontal abduction with ER 3 x 10  - prone \"W\" squeeze 2 x 10    - standing B narrow high row with GSC x 60 seconds isometric hold and 3 x 10   - standing B shoulder extension with RSC x 60 seconds isometric hold and 3       Total Timed Treatment: 50 min  Total Treatment Time: 50 min

## 2021-03-25 ENCOUNTER — OFFICE VISIT (OUTPATIENT)
Dept: PHYSICAL THERAPY | Age: 56
End: 2021-03-25
Attending: ORTHOPAEDIC SURGERY
Payer: MEDICAID

## 2021-03-25 PROCEDURE — 97110 THERAPEUTIC EXERCISES: CPT

## 2021-03-25 NOTE — PROGRESS NOTES
Diagnosis: Tendinitis of both rotator cuffs (M75.81,M75.82)    Date of Onset: November 2020        Next MD visit: none scheduled  Fall Risk: standard         Precautions: n/a          Medication Changes since last visit?: No    Subjective: Patient reports B mid trap 3 x 10    - prone \"W\" squeeze 2 x 10    - standing B narrow high row with GSC x 60 seconds isometric hold and 3 x 10   - standing B shoulder extension with RSC x 60 seconds isometric hold and 3 x 10  - face pull with rope 10#  - not today  - s

## 2021-04-01 ENCOUNTER — APPOINTMENT (OUTPATIENT)
Dept: PHYSICAL THERAPY | Age: 56
End: 2021-04-01
Attending: ORTHOPAEDIC SURGERY
Payer: MEDICAID

## 2021-04-11 ENCOUNTER — E-VISIT (OUTPATIENT)
Dept: TELEHEALTH | Age: 56
End: 2021-04-11

## 2021-04-11 DIAGNOSIS — Z02.9 ENCOUNTERS FOR ADMINISTRATIVE PURPOSES: Primary | ICD-10-CM

## 2021-04-21 ENCOUNTER — TELEMEDICINE (OUTPATIENT)
Dept: INTERNAL MEDICINE CLINIC | Facility: CLINIC | Age: 56
End: 2021-04-21

## 2021-04-21 DIAGNOSIS — N52.9 ERECTILE DYSFUNCTION, UNSPECIFIED ERECTILE DYSFUNCTION TYPE: Primary | ICD-10-CM

## 2021-04-21 PROCEDURE — 99214 OFFICE O/P EST MOD 30 MIN: CPT | Performed by: INTERNAL MEDICINE

## 2021-04-21 NOTE — PROGRESS NOTES
HPI/Subjective:     Patient ID: Clemente Winter is a 50year old male. Erectile Dysfuntion  This is a new problem. The current episode started more than 1 month ago (2mos). The problem is unchanged. The nature of his difficulty is maintaining erection. History    Tobacco Use      Smoking status: Never Smoker      Smokeless tobacco: Never Used    Vaping Use      Vaping Use: Never used    Alcohol use:  Yes      Alcohol/week: 5.0 standard drinks      Types: 5 Standard drinks or equivalent per week    Drug us

## 2021-04-23 ENCOUNTER — LAB ENCOUNTER (OUTPATIENT)
Dept: LAB | Age: 56
End: 2021-04-23
Attending: INTERNAL MEDICINE
Payer: MEDICAID

## 2021-04-23 DIAGNOSIS — N52.9 ERECTILE DYSFUNCTION, UNSPECIFIED ERECTILE DYSFUNCTION TYPE: ICD-10-CM

## 2021-04-23 PROCEDURE — 84443 ASSAY THYROID STIM HORMONE: CPT

## 2021-04-23 PROCEDURE — 84402 ASSAY OF FREE TESTOSTERONE: CPT

## 2021-04-23 PROCEDURE — 81003 URINALYSIS AUTO W/O SCOPE: CPT

## 2021-04-23 PROCEDURE — 84403 ASSAY OF TOTAL TESTOSTERONE: CPT

## 2021-04-23 PROCEDURE — 36415 COLL VENOUS BLD VENIPUNCTURE: CPT

## 2021-04-23 PROCEDURE — 80053 COMPREHEN METABOLIC PANEL: CPT

## 2021-04-27 ENCOUNTER — TELEPHONE (OUTPATIENT)
Dept: INTERNAL MEDICINE CLINIC | Facility: CLINIC | Age: 56
End: 2021-04-27

## 2021-04-27 DIAGNOSIS — N52.9 ERECTILE DYSFUNCTION, UNSPECIFIED ERECTILE DYSFUNCTION TYPE: Primary | ICD-10-CM

## 2021-06-17 ENCOUNTER — OFFICE VISIT (OUTPATIENT)
Dept: SURGERY | Facility: CLINIC | Age: 56
End: 2021-06-17
Payer: MEDICAID

## 2021-06-17 VITALS
WEIGHT: 170 LBS | HEART RATE: 75 BPM | HEIGHT: 66 IN | DIASTOLIC BLOOD PRESSURE: 71 MMHG | RESPIRATION RATE: 16 BRPM | BODY MASS INDEX: 27.32 KG/M2 | SYSTOLIC BLOOD PRESSURE: 108 MMHG

## 2021-06-17 DIAGNOSIS — N20.0 RIGHT KIDNEY STONE: ICD-10-CM

## 2021-06-17 DIAGNOSIS — N52.9 ERECTILE DYSFUNCTION, UNSPECIFIED ERECTILE DYSFUNCTION TYPE: Primary | ICD-10-CM

## 2021-06-17 PROCEDURE — 3008F BODY MASS INDEX DOCD: CPT | Performed by: UROLOGY

## 2021-06-17 PROCEDURE — 3078F DIAST BP <80 MM HG: CPT | Performed by: UROLOGY

## 2021-06-17 PROCEDURE — 3074F SYST BP LT 130 MM HG: CPT | Performed by: UROLOGY

## 2021-06-17 PROCEDURE — 99244 OFF/OP CNSLTJ NEW/EST MOD 40: CPT | Performed by: UROLOGY

## 2021-06-17 RX ORDER — SILDENAFIL 100 MG/1
100 TABLET, FILM COATED ORAL
Qty: 4 TABLET | Refills: 5 | Status: SHIPPED | OUTPATIENT
Start: 2021-06-17

## 2021-06-17 NOTE — PROGRESS NOTES
SUBJECTIVE:  Benoit Macario is a 50year old male who presents for a consultation at the request of, and a copy of this note will be sent to, Dr. Nova Carballo, for evaluation of  erectile dysfunction. He states that the problem is unchanged.  Symptoms inclu for chest tightness, wheezing, cough, shortness of breath,  sputum production,chest pain or pleurisy. CARDIOVASCULAR:  Negative for pain or chest discomfort, dizziness or fainting, palpitations, leg swelling, nocturia, or claudication.   GASTROINTESTINAL: residual symptoms at this time. Patient is pretty sure he passed the stone. –I discussed with the patient the relationship potentially between erectile dysfunction and cardiac disease.   He does not seem to have any risk factors for this but he will discu

## 2021-12-10 ENCOUNTER — HOSPITAL ENCOUNTER (OUTPATIENT)
Dept: GENERAL RADIOLOGY | Age: 56
Discharge: HOME | End: 2021-12-10
Attending: INTERNAL MEDICINE
Payer: COMMERCIAL

## 2021-12-10 ENCOUNTER — OFFICE VISIT (OUTPATIENT)
Dept: INTERNAL MEDICINE CLINIC | Facility: CLINIC | Age: 56
End: 2021-12-10

## 2021-12-10 VITALS
SYSTOLIC BLOOD PRESSURE: 111 MMHG | DIASTOLIC BLOOD PRESSURE: 77 MMHG | BODY MASS INDEX: 27.64 KG/M2 | WEIGHT: 172 LBS | HEIGHT: 66 IN | HEART RATE: 71 BPM

## 2021-12-10 DIAGNOSIS — G89.29 CHRONIC GLUTEAL PAIN: ICD-10-CM

## 2021-12-10 DIAGNOSIS — M79.18 CHRONIC GLUTEAL PAIN: Primary | ICD-10-CM

## 2021-12-10 DIAGNOSIS — G89.29 CHRONIC GLUTEAL PAIN: Primary | ICD-10-CM

## 2021-12-10 DIAGNOSIS — M79.18 CHRONIC GLUTEAL PAIN: ICD-10-CM

## 2021-12-10 PROCEDURE — 90471 IMMUNIZATION ADMIN: CPT | Performed by: INTERNAL MEDICINE

## 2021-12-10 PROCEDURE — 99214 OFFICE O/P EST MOD 30 MIN: CPT | Performed by: INTERNAL MEDICINE

## 2021-12-10 PROCEDURE — 3074F SYST BP LT 130 MM HG: CPT | Performed by: INTERNAL MEDICINE

## 2021-12-10 PROCEDURE — 72220 X-RAY EXAM SACRUM TAILBONE: CPT | Performed by: INTERNAL MEDICINE

## 2021-12-10 PROCEDURE — 3008F BODY MASS INDEX DOCD: CPT | Performed by: INTERNAL MEDICINE

## 2021-12-10 PROCEDURE — 3078F DIAST BP <80 MM HG: CPT | Performed by: INTERNAL MEDICINE

## 2021-12-10 PROCEDURE — 90686 IIV4 VACC NO PRSV 0.5 ML IM: CPT | Performed by: INTERNAL MEDICINE

## 2021-12-10 NOTE — PROGRESS NOTES
Subjective:     Patient ID: Jenniffer Munguia is a 49year old male. Low Back Pain  This is a chronic problem. The current episode started more than 1 month ago (6mos). The problem occurs intermittently.  The problem has been waxing and waning since onset HAND/FINGER SURGERY UNLISTED  2008   • HAND/FINGER SURGERY UNLISTED      LRF tendon repair      Family History   Problem Relation Age of Onset   • Diabetes Mother    • Lipids Mother         Hyperlpidemia   • No Known Problems Sister       Social History: S Ankle clonus negative; SLR negative          Assessment & Plan:   (M79.18,  G89.29) Chronic gluteal pain  (primary encounter diagnosis)  Plan: XR SACRUM + COCCYX (MIN 2 VIEWS) (CPT=72220)        Will get xray of sacrum; will plan to do PT eval/tx once xray

## 2021-12-11 ENCOUNTER — TELEPHONE (OUTPATIENT)
Dept: INTERNAL MEDICINE CLINIC | Facility: CLINIC | Age: 56
End: 2021-12-11

## 2021-12-11 DIAGNOSIS — M79.18 CHRONIC GLUTEAL PAIN: Primary | ICD-10-CM

## 2021-12-11 DIAGNOSIS — G89.29 CHRONIC GLUTEAL PAIN: Primary | ICD-10-CM

## 2021-12-14 DIAGNOSIS — R56.9 SEIZURE (HCC): Primary | ICD-10-CM

## 2021-12-15 RX ORDER — LAMOTRIGINE 200 MG/1
TABLET ORAL
Qty: 270 TABLET | Refills: 0 | Status: SHIPPED
Start: 2021-12-15 | End: 2022-01-20

## 2021-12-15 NOTE — TELEPHONE ENCOUNTER
Refill request for lamotrigine 200 mg, take 2 tabs in am and 1 tab in evening, #270, no refills    LOV: 11/12/20  NOV: 1/20/22

## 2021-12-16 DIAGNOSIS — R56.9 SEIZURE (HCC): ICD-10-CM

## 2021-12-21 ENCOUNTER — TELEPHONE (OUTPATIENT)
Dept: NEUROLOGY | Facility: CLINIC | Age: 56
End: 2021-12-21

## 2021-12-21 DIAGNOSIS — R56.9 SEIZURE (HCC): ICD-10-CM

## 2021-12-21 RX ORDER — LAMOTRIGINE 200 MG/1
TABLET ORAL
Qty: 270 TABLET | Refills: 0 | OUTPATIENT
Start: 2021-12-21

## 2021-12-22 RX ORDER — LAMOTRIGINE 200 MG/1
TABLET ORAL
Qty: 270 TABLET | Refills: 0 | OUTPATIENT
Start: 2021-12-22

## 2021-12-24 DIAGNOSIS — R56.9 SEIZURE (HCC): ICD-10-CM

## 2021-12-27 DIAGNOSIS — R56.9 SEIZURE (HCC): ICD-10-CM

## 2021-12-27 RX ORDER — LAMOTRIGINE 200 MG/1
TABLET ORAL
Qty: 270 TABLET | Refills: 0 | OUTPATIENT
Start: 2021-12-27

## 2021-12-27 NOTE — TELEPHONE ENCOUNTER
Medication: LAMOTRIGINE 200 MG Oral Tab    Per Epic review, Prescription e-scribed on 12/15/21. [address] [facility] spoke with pharmacist to verify receipt. Pharmacy did not receive prescription on 12/15 & states last was from September.

## 2021-12-27 NOTE — TELEPHONE ENCOUNTER
Patient's wife called to say that [facility] is saying they have NOT received this prescription. They are getting nervous as he only has a few left. They would like someone to call the Pharmacy.   lamoTRIgine 200 MG Oral Tab

## 2022-01-03 ENCOUNTER — TELEPHONE (OUTPATIENT)
Dept: PHYSICAL THERAPY | Facility: HOSPITAL | Age: 57
End: 2022-01-03

## 2022-01-05 ENCOUNTER — APPOINTMENT (OUTPATIENT)
Dept: PHYSICAL THERAPY | Age: 57
End: 2022-01-05
Attending: INTERNAL MEDICINE
Payer: COMMERCIAL

## 2022-01-07 ENCOUNTER — OFFICE VISIT (OUTPATIENT)
Dept: PHYSICAL THERAPY | Age: 57
End: 2022-01-07
Attending: INTERNAL MEDICINE
Payer: COMMERCIAL

## 2022-01-07 PROCEDURE — 97110 THERAPEUTIC EXERCISES: CPT | Performed by: PHYSICAL THERAPIST

## 2022-01-07 PROCEDURE — 97161 PT EVAL LOW COMPLEX 20 MIN: CPT | Performed by: PHYSICAL THERAPIST

## 2022-01-07 NOTE — PROGRESS NOTES
P.T. EVALUATION:   Referring Physician: Dr. Miya Narayanan  Diagnosis: Chronic gluteal pain (M79.18,G89.29)    Date of Onset: 12/11/2021 Date of Service: 1/7/2022     PATIENT SUMMARY   Patient verbalized consent for Physical Therapy treatment.   Karen Garcia skilled Physical Therapy to address the above impairments to relieve pain and resume previous activities without difficulty. Precautions:  None     OBJECTIVE:   Observation: Alert and oriented x 3. Ambulatory into department.     Palpation: (-) tendernes participate in planning and for this course of care. Thank you for your referral. Please co-sign or sign and return this letter via fax as soon as possible to [phone].  If you have any questions, please contact me at Dept: [phone]    Sincerely,

## 2022-01-10 ENCOUNTER — APPOINTMENT (OUTPATIENT)
Dept: PHYSICAL THERAPY | Age: 57
End: 2022-01-10
Attending: INTERNAL MEDICINE
Payer: COMMERCIAL

## 2022-01-14 ENCOUNTER — OFFICE VISIT (OUTPATIENT)
Dept: PHYSICAL THERAPY | Age: 57
End: 2022-01-14
Attending: INTERNAL MEDICINE
Payer: COMMERCIAL

## 2022-01-14 PROCEDURE — 97110 THERAPEUTIC EXERCISES: CPT | Performed by: PHYSICAL THERAPIST

## 2022-01-14 NOTE — PROGRESS NOTES
Diagnosis: Chronic gluteal pain (M79.18,G89.29)          Next MD visit: none scheduled  Fall Risk: standard         Precautions: n/a          Medication Changes since last visit?: No      Subjective: States his buttock is feeling better.  Does not hurt as

## 2022-01-20 ENCOUNTER — OFFICE VISIT (OUTPATIENT)
Dept: NEUROLOGY | Facility: CLINIC | Age: 57
End: 2022-01-20
Payer: COMMERCIAL

## 2022-01-20 VITALS
WEIGHT: 170 LBS | BODY MASS INDEX: 27.32 KG/M2 | HEART RATE: 78 BPM | HEIGHT: 66 IN | SYSTOLIC BLOOD PRESSURE: 108 MMHG | DIASTOLIC BLOOD PRESSURE: 76 MMHG

## 2022-01-20 DIAGNOSIS — R56.9 SEIZURE (HCC): ICD-10-CM

## 2022-01-20 PROCEDURE — 3078F DIAST BP <80 MM HG: CPT | Performed by: OTHER

## 2022-01-20 PROCEDURE — 3074F SYST BP LT 130 MM HG: CPT | Performed by: OTHER

## 2022-01-20 PROCEDURE — 3008F BODY MASS INDEX DOCD: CPT | Performed by: OTHER

## 2022-01-20 PROCEDURE — 99213 OFFICE O/P EST LOW 20 MIN: CPT | Performed by: OTHER

## 2022-01-20 RX ORDER — LAMOTRIGINE 200 MG/1
TABLET ORAL
Qty: 270 TABLET | Refills: 3 | Status: SHIPPED | OUTPATIENT
Start: 2022-01-20

## 2022-01-20 NOTE — PROGRESS NOTES
Mr. Schuyler Graham, stated he had several seizures a month ago because he ran out of his medication. He states that he and his wife had called several times for refills and refills were not sent.  According to what is documented in the chart there is only 1 telepho

## 2022-01-21 ENCOUNTER — OFFICE VISIT (OUTPATIENT)
Dept: PHYSICAL THERAPY | Age: 57
End: 2022-01-21
Attending: INTERNAL MEDICINE
Payer: COMMERCIAL

## 2022-01-21 PROCEDURE — 97110 THERAPEUTIC EXERCISES: CPT | Performed by: PHYSICAL THERAPIST

## 2022-01-21 PROCEDURE — 97140 MANUAL THERAPY 1/> REGIONS: CPT | Performed by: PHYSICAL THERAPIST

## 2022-01-21 NOTE — PROGRESS NOTES
Diagnosis: Chronic gluteal pain (M79.18,G89.29)          Next MD visit: none scheduled  Fall Risk: standard         Precautions: n/a          Medication Changes since last visit?: No      Subjective: States his buttock is feeling better.  Reports no pain into extension                    Charges: EX2, Manual PT1       Total Timed Treatment: 40 min  Total Treatment Time: 40 min

## 2022-01-24 ENCOUNTER — APPOINTMENT (OUTPATIENT)
Dept: PHYSICAL THERAPY | Age: 57
End: 2022-01-24
Attending: INTERNAL MEDICINE
Payer: COMMERCIAL

## 2022-01-26 ENCOUNTER — APPOINTMENT (OUTPATIENT)
Dept: PHYSICAL THERAPY | Age: 57
End: 2022-01-26
Attending: INTERNAL MEDICINE
Payer: COMMERCIAL

## 2022-01-31 ENCOUNTER — APPOINTMENT (OUTPATIENT)
Dept: PHYSICAL THERAPY | Age: 57
End: 2022-01-31
Attending: INTERNAL MEDICINE
Payer: COMMERCIAL

## 2022-02-04 ENCOUNTER — APPOINTMENT (OUTPATIENT)
Dept: PHYSICAL THERAPY | Age: 57
End: 2022-02-04
Attending: INTERNAL MEDICINE
Payer: COMMERCIAL

## 2022-02-11 ENCOUNTER — APPOINTMENT (OUTPATIENT)
Dept: PHYSICAL THERAPY | Age: 57
End: 2022-02-11
Attending: INTERNAL MEDICINE
Payer: COMMERCIAL

## 2022-10-01 ENCOUNTER — OFFICE VISIT (OUTPATIENT)
Dept: OTOLARYNGOLOGY | Facility: CLINIC | Age: 57
End: 2022-10-01
Payer: COMMERCIAL

## 2022-10-01 DIAGNOSIS — H61.23 CERUMEN DEBRIS ON TYMPANIC MEMBRANE OF BOTH EARS: Primary | ICD-10-CM

## 2022-10-01 NOTE — PROGRESS NOTES
Ears = bilateral cerumen occlussions. Fully cleaned under microscope using instrumentation and suctioning. Normal tympanic membranes. Follow-up if there are any additional questions or problems.

## 2022-11-28 ENCOUNTER — OFFICE VISIT (OUTPATIENT)
Dept: INTERNAL MEDICINE CLINIC | Facility: CLINIC | Age: 57
End: 2022-11-28
Payer: MEDICAID

## 2022-11-28 ENCOUNTER — IMMUNIZATION (OUTPATIENT)
Dept: INTERNAL MEDICINE CLINIC | Facility: CLINIC | Age: 57
End: 2022-11-28
Payer: MEDICAID

## 2022-11-28 VITALS
BODY MASS INDEX: 27.3 KG/M2 | OXYGEN SATURATION: 97 % | SYSTOLIC BLOOD PRESSURE: 103 MMHG | WEIGHT: 169.88 LBS | TEMPERATURE: 98 F | HEART RATE: 91 BPM | HEIGHT: 66 IN | DIASTOLIC BLOOD PRESSURE: 67 MMHG

## 2022-11-28 DIAGNOSIS — G40.909 SEIZURE DISORDER (HCC): Primary | ICD-10-CM

## 2022-11-28 DIAGNOSIS — Z23 NEED FOR VACCINATION: Primary | ICD-10-CM

## 2022-11-28 PROCEDURE — 3008F BODY MASS INDEX DOCD: CPT | Performed by: INTERNAL MEDICINE

## 2022-11-28 PROCEDURE — 3074F SYST BP LT 130 MM HG: CPT | Performed by: INTERNAL MEDICINE

## 2022-11-28 PROCEDURE — 3078F DIAST BP <80 MM HG: CPT | Performed by: INTERNAL MEDICINE

## 2022-12-21 ENCOUNTER — OFFICE VISIT (OUTPATIENT)
Dept: NEUROLOGY | Facility: CLINIC | Age: 57
End: 2022-12-21
Payer: MEDICAID

## 2022-12-21 ENCOUNTER — LAB ENCOUNTER (OUTPATIENT)
Dept: LAB | Age: 57
End: 2022-12-21
Attending: Other
Payer: MEDICAID

## 2022-12-21 VITALS
DIASTOLIC BLOOD PRESSURE: 67 MMHG | SYSTOLIC BLOOD PRESSURE: 103 MMHG | BODY MASS INDEX: 27.16 KG/M2 | HEIGHT: 66 IN | WEIGHT: 169 LBS

## 2022-12-21 DIAGNOSIS — R56.9 SEIZURE (HCC): Primary | ICD-10-CM

## 2022-12-21 LAB
ALT SERPL-CCNC: 36 U/L
AST SERPL-CCNC: 40 U/L (ref 15–37)
BASOPHILS # BLD AUTO: 0.03 X10(3) UL (ref 0–0.2)
BASOPHILS NFR BLD AUTO: 0.5 %
DEPRECATED RDW RBC AUTO: 43.8 FL (ref 35.1–46.3)
EOSINOPHIL # BLD AUTO: 0.07 X10(3) UL (ref 0–0.7)
EOSINOPHIL NFR BLD AUTO: 1.3 %
ERYTHROCYTE [DISTWIDTH] IN BLOOD BY AUTOMATED COUNT: 12.7 % (ref 11–15)
HCT VFR BLD AUTO: 42.1 %
HGB BLD-MCNC: 14.3 G/DL
IMM GRANULOCYTES # BLD AUTO: 0.02 X10(3) UL (ref 0–1)
IMM GRANULOCYTES NFR BLD: 0.4 %
LYMPHOCYTES # BLD AUTO: 1.27 X10(3) UL (ref 1–4)
LYMPHOCYTES NFR BLD AUTO: 22.7 %
MCH RBC QN AUTO: 31.7 PG (ref 26–34)
MCHC RBC AUTO-ENTMCNC: 34 G/DL (ref 31–37)
MCV RBC AUTO: 93.3 FL
MONOCYTES # BLD AUTO: 0.5 X10(3) UL (ref 0.1–1)
MONOCYTES NFR BLD AUTO: 8.9 %
NEUTROPHILS # BLD AUTO: 3.71 X10 (3) UL (ref 1.5–7.7)
NEUTROPHILS # BLD AUTO: 3.71 X10(3) UL (ref 1.5–7.7)
NEUTROPHILS NFR BLD AUTO: 66.2 %
PLATELET # BLD AUTO: 197 10(3)UL (ref 150–450)
RBC # BLD AUTO: 4.51 X10(6)UL
WBC # BLD AUTO: 5.6 X10(3) UL (ref 4–11)

## 2022-12-21 PROCEDURE — 3078F DIAST BP <80 MM HG: CPT | Performed by: OTHER

## 2022-12-21 PROCEDURE — 84460 ALANINE AMINO (ALT) (SGPT): CPT | Performed by: OTHER

## 2022-12-21 PROCEDURE — 99214 OFFICE O/P EST MOD 30 MIN: CPT | Performed by: OTHER

## 2022-12-21 PROCEDURE — 3008F BODY MASS INDEX DOCD: CPT | Performed by: OTHER

## 2022-12-21 PROCEDURE — 36415 COLL VENOUS BLD VENIPUNCTURE: CPT | Performed by: OTHER

## 2022-12-21 PROCEDURE — 84450 TRANSFERASE (AST) (SGOT): CPT | Performed by: OTHER

## 2022-12-21 PROCEDURE — 3074F SYST BP LT 130 MM HG: CPT | Performed by: OTHER

## 2022-12-21 PROCEDURE — 80175 DRUG SCREEN QUAN LAMOTRIGINE: CPT

## 2022-12-21 PROCEDURE — 85025 COMPLETE CBC W/AUTO DIFF WBC: CPT | Performed by: OTHER

## 2022-12-21 RX ORDER — LAMOTRIGINE 200 MG/1
200 TABLET ORAL 2 TIMES DAILY
Qty: 180 TABLET | Refills: 3 | Status: SHIPPED | OUTPATIENT
Start: 2022-12-21 | End: 2022-12-21

## 2022-12-21 RX ORDER — LAMOTRIGINE 200 MG/1
200 TABLET ORAL 2 TIMES DAILY
Qty: 270 TABLET | Refills: 3 | Status: SHIPPED | OUTPATIENT
Start: 2022-12-21

## 2022-12-21 NOTE — PROGRESS NOTES
Mr. Shelby Figueroa, relates on his own he increased Lamictal from 200 mg, 2 in the morning, 1 at evening, which she has been on for a number years, to 2 p.o. twice daily. I told him this is too high of a dose. I told him this potentially can lead to hepatic, hematological abnormalities which are potentially fatal.  I told him to check a Lamictal level with CBC liver function test today. Resume Lamictal 200 mg, 2 in the morning, 1 in the evening. He states he has had no seizures since his last office visit. He denies headache, neck pain, low back pain. He denies focal motor, sensory symptoms in the arms or legs. Epic records, labs, physicians notes reviewed. Reviewed his previous Lamictal levels which were all therapeutic on 600 mg/day. I told him he cannot change his medication without consulting with a physician. As previously mentioned at his last office visit I requested that he find neurology care outside of the [facility] and may contact local universities, local hospitals, [facility] for neurology referrals. Also told him to call the office next Tuesday for the results of Lamictal, CBC, liver function test results.

## 2022-12-22 ENCOUNTER — TELEPHONE (OUTPATIENT)
Dept: NEUROLOGY | Facility: CLINIC | Age: 57
End: 2022-12-22

## 2022-12-22 NOTE — TELEPHONE ENCOUNTER
Patient returned call. He did receive the message, but was questioning what dose of lamotrigine Dr Kaykay Camarillo wants him to be on. Patient is currently taking Lamotrigine 200mg oral tabs, 2 in the morning & 2 in the evening for a total of 800mg daily. Discussed prescription sent to pharmacy on 12/21/22 for Lamotrigine 200mg, 2 in am & 1 in evening (600mg daily). Pt states prescription called in yesterday is what his dose was prior to getting covid. When he got covid, he increased his dose to a total of 800mg daily.

## 2022-12-22 NOTE — TELEPHONE ENCOUNTER
Called patient. Left detailed voice message notifying of message from Dr Ryne Cheema below.  Advised to call office with any questions

## 2022-12-22 NOTE — TELEPHONE ENCOUNTER
Please call patient: 1 liver function test is slightly elevated. Please tell him to contact the office and 3 months and have liver function test repeated.

## 2022-12-22 NOTE — TELEPHONE ENCOUNTER
I discussed at great length with him in the office that he should be on 400 mg in the morning, 200 mg in the evening. Told him to check a Lamictal level and to call the office next Tuesday for the results. He increase Lamictal to 400 mg p.o. twice daily on his own. I discussed the potential hepatic, hematological abnormalities with toxic dosages of Lamictal.  I did tell him that recommended dose of Lamictal is up to 400-500 mg/day.

## 2022-12-23 LAB — LAMOTRIGINE: 11.8 UG/ML

## 2023-02-01 ENCOUNTER — TELEPHONE (OUTPATIENT)
Dept: SURGERY | Facility: CLINIC | Age: 58
End: 2023-02-01

## 2023-02-01 NOTE — TELEPHONE ENCOUNTER
Left message to call back. Patient is currently scheduled for 2/20/2023 at 4:20pm. There has been a change in MD schedule and patient needs to be rescheduled. In message I indicated that we have an opening tomorrow February 2,2023 at either 11:20 or 11:40am or 2/7/2023 at 10:40a or 2/23/2023 at 1:10pm Patient is a office visit for 10 min. Please offer and book if patient is interested. If [facility] is not able to accept any of these time please reschedule next available. Thank you!

## 2023-11-01 ENCOUNTER — OFFICE VISIT (OUTPATIENT)
Dept: NEUROLOGY | Facility: CLINIC | Age: 58
End: 2023-11-01
Payer: COMMERCIAL

## 2023-11-01 VITALS
HEART RATE: 92 BPM | BODY MASS INDEX: 27.16 KG/M2 | RESPIRATION RATE: 16 BRPM | HEIGHT: 66 IN | DIASTOLIC BLOOD PRESSURE: 71 MMHG | WEIGHT: 169 LBS | SYSTOLIC BLOOD PRESSURE: 105 MMHG

## 2023-11-01 DIAGNOSIS — G40.209 PARTIAL SYMPTOMATIC EPILEPSY WITH COMPLEX PARTIAL SEIZURES, NOT INTRACTABLE, WITHOUT STATUS EPILEPTICUS (HCC): Primary | ICD-10-CM

## 2023-11-01 PROCEDURE — 3078F DIAST BP <80 MM HG: CPT | Performed by: OTHER

## 2023-11-01 PROCEDURE — 99214 OFFICE O/P EST MOD 30 MIN: CPT | Performed by: OTHER

## 2023-11-01 PROCEDURE — 3008F BODY MASS INDEX DOCD: CPT | Performed by: OTHER

## 2023-11-01 PROCEDURE — 3074F SYST BP LT 130 MM HG: CPT | Performed by: OTHER

## 2023-11-01 RX ORDER — LAMOTRIGINE 200 MG/1
200 TABLET ORAL 2 TIMES DAILY
Qty: 270 TABLET | Refills: 3 | Status: SHIPPED | OUTPATIENT
Start: 2023-11-01

## 2023-11-03 ENCOUNTER — LAB ENCOUNTER (OUTPATIENT)
Dept: LAB | Age: 58
End: 2023-11-03
Attending: Other
Payer: COMMERCIAL

## 2023-11-03 ENCOUNTER — OFFICE VISIT (OUTPATIENT)
Dept: INTERNAL MEDICINE CLINIC | Facility: CLINIC | Age: 58
End: 2023-11-03
Payer: COMMERCIAL

## 2023-11-03 VITALS
DIASTOLIC BLOOD PRESSURE: 77 MMHG | WEIGHT: 190.19 LBS | HEART RATE: 94 BPM | TEMPERATURE: 99 F | SYSTOLIC BLOOD PRESSURE: 112 MMHG | BODY MASS INDEX: 30.57 KG/M2 | OXYGEN SATURATION: 95 % | HEIGHT: 66 IN

## 2023-11-03 DIAGNOSIS — Z85.820 HISTORY OF MELANOMA: ICD-10-CM

## 2023-11-03 DIAGNOSIS — G56.03 BILATERAL CARPAL TUNNEL SYNDROME: ICD-10-CM

## 2023-11-03 DIAGNOSIS — Z00.00 ANNUAL PHYSICAL EXAM: Primary | ICD-10-CM

## 2023-11-03 DIAGNOSIS — K21.9 GASTROESOPHAGEAL REFLUX DISEASE, UNSPECIFIED WHETHER ESOPHAGITIS PRESENT: ICD-10-CM

## 2023-11-03 DIAGNOSIS — G40.909 SEIZURE DISORDER (HCC): ICD-10-CM

## 2023-11-03 DIAGNOSIS — G40.001 PARTIAL IDIOPATHIC EPILEPSY WITH SEIZURES OF LOCALIZED ONSET, NOT INTRACTABLE, WITH STATUS EPILEPTICUS (HCC): Primary | ICD-10-CM

## 2023-11-03 PROCEDURE — 80175 DRUG SCREEN QUAN LAMOTRIGINE: CPT

## 2023-11-03 PROCEDURE — 99396 PREV VISIT EST AGE 40-64: CPT | Performed by: INTERNAL MEDICINE

## 2023-11-03 PROCEDURE — 3078F DIAST BP <80 MM HG: CPT | Performed by: INTERNAL MEDICINE

## 2023-11-03 PROCEDURE — 3008F BODY MASS INDEX DOCD: CPT | Performed by: INTERNAL MEDICINE

## 2023-11-03 PROCEDURE — 36415 COLL VENOUS BLD VENIPUNCTURE: CPT

## 2023-11-03 PROCEDURE — 3074F SYST BP LT 130 MM HG: CPT | Performed by: INTERNAL MEDICINE

## 2023-11-03 NOTE — PROGRESS NOTES
Subjective:   Patient ID: Latasha Leach is a 66year old male. Patient presents today for annual physical.         History/Other:   Review of Systems   Constitutional: Negative. HENT: Negative. Eyes: Negative. Respiratory: Negative. No hemoptysis   Cardiovascular: Negative. Negative for chest pain and palpitations. Gastrointestinal: Negative. Genitourinary: Negative. No nocturia   Allergic/Immunologic: Negative for food allergies. Neurological:  Negative for syncope. Hematological: Negative. Current Outpatient Medications   Medication Sig Dispense Refill    lamoTRIgine 200 MG Oral Tab Take 1 tablet (200 mg total) by mouth 2 (two) times daily. Two in the morning, one in the evening 270 tablet 3    omeprazole 20 MG Oral Capsule Delayed Release Take 1 capsule (20 mg total) by mouth every morning before breakfast.       Allergies:No Known Allergies    Objective:   Physical Exam  Constitutional:       General: He is not in acute distress. Appearance: He is well-developed. He is not ill-appearing, toxic-appearing or diaphoretic. HENT:      Head: Normocephalic and atraumatic. Right Ear: Tympanic membrane, ear canal and external ear normal.      Left Ear: Tympanic membrane, ear canal and external ear normal.      Nose: Nose normal.      Mouth/Throat:      Pharynx: No oropharyngeal exudate. Eyes:      General: No scleral icterus. Right eye: No discharge. Left eye: No discharge. Conjunctiva/sclera: Conjunctivae normal.      Pupils: Pupils are equal, round, and reactive to light. Neck:      Thyroid: No thyromegaly. Vascular: No JVD. Cardiovascular:      Rate and Rhythm: Normal rate and regular rhythm. Pulses: Normal pulses. Heart sounds: Normal heart sounds. No murmur heard. No friction rub. No gallop. Pulmonary:      Effort: Pulmonary effort is normal. No respiratory distress. Breath sounds: Normal breath sounds.  No wheezing or rales. Abdominal:      General: Abdomen is flat. Bowel sounds are normal. There is no distension. Palpations: Abdomen is soft. There is no mass. Tenderness: There is no abdominal tenderness. There is no guarding or rebound. Genitourinary:     Penis: Normal.       Prostate: Normal.      Rectum: Normal.   Musculoskeletal:         General: Normal range of motion. Cervical back: Normal range of motion and neck supple. No rigidity. Right lower leg: No edema. Left lower leg: No edema. Lymphadenopathy:      Cervical: No cervical adenopathy. Skin:     General: Skin is warm and dry. Coloration: Skin is not jaundiced or pale. Findings: No rash. Neurological:      Mental Status: He is alert and oriented to person, place, and time. Psychiatric:         Mood and Affect: Mood normal.         Assessment & Plan:   (Z00.00) Annual physical exam  (primary encounter diagnosis)  Plan: CBC With Differential With Platelet, Comp         Metabolic Panel (14), Lipid Panel, PSA Total,         Screen        Routine labs ordered; pt already had his flu shot from work.  Advisd to get covid booster and shingrix vaccine.     (G40.909) Seizure disorder ([address]  Plan: pt ffup with neurologist; on lamotrigine.     (K21.9) Gastroesophageal reflux disease, unspecified whether esophagitis present  Plan: pt stable on PPI    (G56.03) Bilateral carpal tunnel syndrome  Plan: Hand & Microvascular Surgery Referral - [facility]         for [facility] (Dr. Shay Christianson)        Pt complained of having pain/tingling on his hands typical of carpal tunnel syndrome, refer to hand surgeon.     (U39.023) History of melanoma  Plan: Derm Referral - In Network        Had not seen derm for few years for surveillance; pt given referral .        Orders Placed This Encounter      CBC With Differential With Platelet      Comp Metabolic Panel (14)      Lipid Panel      PSA Total, Screen      Meds This Visit:  Requested Prescriptions      No prescriptions requested or ordered in this encounter       Imaging & Referrals:  None

## 2023-11-04 ENCOUNTER — LAB ENCOUNTER (OUTPATIENT)
Dept: LAB | Age: 58
End: 2023-11-04
Attending: INTERNAL MEDICINE
Payer: COMMERCIAL

## 2023-11-04 DIAGNOSIS — Z00.00 ANNUAL PHYSICAL EXAM: ICD-10-CM

## 2023-11-04 LAB
ALBUMIN SERPL-MCNC: 4.5 G/DL (ref 3.2–4.8)
ALBUMIN/GLOB SERPL: 1.6 {RATIO} (ref 1–2)
ALP LIVER SERPL-CCNC: 81 U/L
ALT SERPL-CCNC: 37 U/L
ANION GAP SERPL CALC-SCNC: 10 MMOL/L (ref 0–18)
AST SERPL-CCNC: 28 U/L (ref ?–34)
BASOPHILS # BLD AUTO: 0.06 X10(3) UL (ref 0–0.2)
BASOPHILS NFR BLD AUTO: 1 %
BILIRUB SERPL-MCNC: 0.4 MG/DL (ref 0.3–1.2)
BUN BLD-MCNC: 15 MG/DL (ref 9–23)
BUN/CREAT SERPL: 14.6 (ref 10–20)
CALCIUM BLD-MCNC: 10 MG/DL (ref 8.7–10.4)
CHLORIDE SERPL-SCNC: 106 MMOL/L (ref 98–112)
CHOLEST SERPL-MCNC: 191 MG/DL (ref ?–200)
CO2 SERPL-SCNC: 25 MMOL/L (ref 21–32)
COMPLEXED PSA SERPL-MCNC: 1.1 NG/ML (ref ?–4)
COMPLEXED PSA SERPL-MCNC: 1.65 NG/ML (ref ?–4)
CREAT BLD-MCNC: 1.03 MG/DL
DEPRECATED RDW RBC AUTO: 42.5 FL (ref 35.1–46.3)
EGFRCR SERPLBLD CKD-EPI 2021: 85 ML/MIN/1.73M2 (ref 60–?)
EOSINOPHIL # BLD AUTO: 0.14 X10(3) UL (ref 0–0.7)
EOSINOPHIL NFR BLD AUTO: 2.2 %
ERYTHROCYTE [DISTWIDTH] IN BLOOD BY AUTOMATED COUNT: 12.6 % (ref 11–15)
FASTING PATIENT LIPID ANSWER: YES
FASTING STATUS PATIENT QL REPORTED: YES
GLOBULIN PLAS-MCNC: 2.9 G/DL (ref 2.8–4.4)
GLUCOSE BLD-MCNC: 110 MG/DL (ref 70–99)
HCT VFR BLD AUTO: 46.6 %
HDLC SERPL-MCNC: 50 MG/DL (ref 40–59)
HGB BLD-MCNC: 15.7 G/DL
IMM GRANULOCYTES # BLD AUTO: 0.04 X10(3) UL (ref 0–1)
IMM GRANULOCYTES NFR BLD: 0.6 %
LDLC SERPL CALC-MCNC: 120 MG/DL (ref ?–100)
LYMPHOCYTES # BLD AUTO: 1.46 X10(3) UL (ref 1–4)
LYMPHOCYTES NFR BLD AUTO: 23.4 %
MCH RBC QN AUTO: 30.9 PG (ref 26–34)
MCHC RBC AUTO-ENTMCNC: 33.7 G/DL (ref 31–37)
MCV RBC AUTO: 91.7 FL
MONOCYTES # BLD AUTO: 0.6 X10(3) UL (ref 0.1–1)
MONOCYTES NFR BLD AUTO: 9.6 %
NEUTROPHILS # BLD AUTO: 3.95 X10 (3) UL (ref 1.5–7.7)
NEUTROPHILS # BLD AUTO: 3.95 X10(3) UL (ref 1.5–7.7)
NEUTROPHILS NFR BLD AUTO: 63.2 %
NONHDLC SERPL-MCNC: 141 MG/DL (ref ?–130)
OSMOLALITY SERPL CALC.SUM OF ELEC: 293 MOSM/KG (ref 275–295)
PLATELET # BLD AUTO: 253 10(3)UL (ref 150–450)
POTASSIUM SERPL-SCNC: 4.3 MMOL/L (ref 3.5–5.1)
PROT SERPL-MCNC: 7.4 G/DL (ref 5.7–8.2)
RBC # BLD AUTO: 5.08 X10(6)UL
SODIUM SERPL-SCNC: 141 MMOL/L (ref 136–145)
TRIGL SERPL-MCNC: 119 MG/DL (ref 30–149)
VLDLC SERPL CALC-MCNC: 21 MG/DL (ref 0–30)
WBC # BLD AUTO: 6.3 X10(3) UL (ref 4–11)

## 2023-11-04 PROCEDURE — 80061 LIPID PANEL: CPT | Performed by: INTERNAL MEDICINE

## 2023-11-04 PROCEDURE — 36415 COLL VENOUS BLD VENIPUNCTURE: CPT | Performed by: INTERNAL MEDICINE

## 2023-11-04 PROCEDURE — 85025 COMPLETE CBC W/AUTO DIFF WBC: CPT | Performed by: INTERNAL MEDICINE

## 2023-11-04 PROCEDURE — 80053 COMPREHEN METABOLIC PANEL: CPT | Performed by: INTERNAL MEDICINE

## 2023-11-06 DIAGNOSIS — G40.209 PARTIAL SYMPTOMATIC EPILEPSY WITH COMPLEX PARTIAL SEIZURES, NOT INTRACTABLE, WITHOUT STATUS EPILEPTICUS (HCC): ICD-10-CM

## 2023-11-06 RX ORDER — LAMOTRIGINE 200 MG/1
200 TABLET ORAL 2 TIMES DAILY
Qty: 270 TABLET | Refills: 3 | OUTPATIENT
Start: 2023-11-06

## 2023-11-06 NOTE — TELEPHONE ENCOUNTER
Per Dr Gagnon's notes:Impression/ Plan:  Mike Don is a 58 year old male who presents to establish care for epilepsy.  Patient has history of seizures since childhood refractory to medical management. He underwent seizure surgery age 20 at [facility] and was on Lamictal up to 800 mg daily in the past btu relatively well controlled in the past on 400 mg AM / 200 mg PM.  [facility] Dr Aaron and Dr. Benoit but presents here to establish care.      He states when he was younger he had surgery with resection of R temporal lobe.  He states he was off seizure medications after surgery at age 20 but had recurrence when he was older.  He was on Dilantin and phenobarbital and changed to lamotrigine (Lamictal) and has been on 400 mg AM / 200 mg nightly in the past. He states he had COVID a year and a half ago and went up on his own temporarily up to 400 mg bid.      He states he then weaned down to 200 mg bid and has been on this dose for the past year.  He denies any recent seizures but states in the past he has had times where he would have a tingling or weird feeling in his mouth but would not lose consciousness.  He would have this for a minute or two and note this would occur mainly when he was drinking caffeine more frequently and was not sleeping well.       Currently, he is doing well with no seizures reported; will check levels and continue same dose of lamotrigine 200 mg bid for now     (G40.209) Partial symptomatic epilepsy with complex partial seizures, not intractable, without status epilepticus (HCC)  (primary encounter diagnosis)  Plan: lamoTRIgine 200 MG Oral Tab, Lamotrigine         (Lamictal), Serum        As noted above     Return in about 4 months (around 3/1/2024).

## 2023-11-06 NOTE — TELEPHONE ENCOUNTER
RN received a call from [employer] at [facility] and confirmed the patient is taking Lamotrigine 200mg BID. [facility]pharm tech verbalized the order back. No further action needed.

## 2023-11-07 LAB — LAMOTRIGINE LVL: 7.7 UG/ML

## 2024-01-30 ENCOUNTER — TELEPHONE (OUTPATIENT)
Dept: INTERNAL MEDICINE CLINIC | Facility: CLINIC | Age: 59
End: 2024-01-30

## 2024-01-30 DIAGNOSIS — R20.2 PARESTHESIA OF BOTH HANDS: Primary | ICD-10-CM

## 2024-01-30 NOTE — TELEPHONE ENCOUNTER
Spoke with patient, (  Name and  verified ) informed of Dr. Myers's  instructions below to have the EMG done     Provided information for  Neuro office  at [phone]    Patient verbalizes understanding and agrees with plan.

## 2024-01-30 NOTE — TELEPHONE ENCOUNTER
Patient was referred to Hand specialist back in November.     Patient called to schedule an appointment but they will not schedule patient without patient having EMG first. Are you willing to order this for the patient so he can proceed with scheduling visit?     Office says he needs to have this EMG done first, then they will schedule the patient for visit.

## 2024-03-04 ENCOUNTER — PROCEDURE VISIT (OUTPATIENT)
Dept: PHYSICAL MEDICINE AND REHAB | Facility: CLINIC | Age: 59
End: 2024-03-04
Payer: COMMERCIAL

## 2024-03-04 ENCOUNTER — HOSPITAL ENCOUNTER (OUTPATIENT)
Dept: CT IMAGING | Facility: HOSPITAL | Age: 59
End: 2024-03-04
Attending: INTERNAL MEDICINE

## 2024-03-04 ENCOUNTER — OFFICE VISIT (OUTPATIENT)
Dept: NEUROLOGY | Facility: CLINIC | Age: 59
End: 2024-03-04
Payer: COMMERCIAL

## 2024-03-04 VITALS
SYSTOLIC BLOOD PRESSURE: 108 MMHG | DIASTOLIC BLOOD PRESSURE: 80 MMHG | RESPIRATION RATE: 16 BRPM | HEART RATE: 72 BPM | WEIGHT: 190 LBS | BODY MASS INDEX: 31 KG/M2

## 2024-03-04 DIAGNOSIS — R20.0 NUMBNESS AND TINGLING IN BOTH HANDS: ICD-10-CM

## 2024-03-04 DIAGNOSIS — Z13.6 SCREENING FOR CARDIOVASCULAR CONDITION: ICD-10-CM

## 2024-03-04 DIAGNOSIS — M79.642 PAIN IN BOTH HANDS: Primary | ICD-10-CM

## 2024-03-04 DIAGNOSIS — R20.2 NUMBNESS AND TINGLING IN BOTH HANDS: ICD-10-CM

## 2024-03-04 DIAGNOSIS — G40.209 PARTIAL SYMPTOMATIC EPILEPSY WITH COMPLEX PARTIAL SEIZURES, NOT INTRACTABLE, WITHOUT STATUS EPILEPTICUS (HCC): Primary | ICD-10-CM

## 2024-03-04 DIAGNOSIS — M79.641 PAIN IN BOTH HANDS: Primary | ICD-10-CM

## 2024-03-04 PROCEDURE — 99213 OFFICE O/P EST LOW 20 MIN: CPT | Performed by: OTHER

## 2024-03-04 PROCEDURE — 95910 NRV CNDJ TEST 7-8 STUDIES: CPT | Performed by: PHYSICAL MEDICINE & REHABILITATION

## 2024-03-04 PROCEDURE — 95886 MUSC TEST DONE W/N TEST COMP: CPT | Performed by: PHYSICAL MEDICINE & REHABILITATION

## 2024-03-04 RX ORDER — LAMOTRIGINE 200 MG/1
200 TABLET ORAL 2 TIMES DAILY
Qty: 180 TABLET | Refills: 3 | Status: SHIPPED | OUTPATIENT
Start: 2024-03-04

## 2024-03-04 NOTE — PROGRESS NOTES
Date of Service 3/4/2024    LAURA NUNEZ  Date of Birth 8/20/1965    Patient Age: 58 year old    PCP: Paulino Salgado MD  [address] [place] [id number]    Heart Scan Consult  Preliminary Heart Scan Score: 0    Previous Screening  Heart Scan Completed Previously: No        Peripheral Vascular Scan Completed Previously: No          Risk Factors  Personal Risk Factors  Non-alterable Risk Factors: Personal History;Age;Gender;Family History  Alterable Risk Factors: Abnormal Cholesterol          Blood Pressure  There were no vitals taken for this visit.  (Normal =< 120/80,  Elevated = 120-129/ >80,  High Stage1 130-139/80-89 , Stage2 >140/>90)    Lipid Profile  Cholesterol: 191, done on 11/4/2023.  HDL Cholesterol: 50, done on 11/4/2023.  LDL Cholesterol: 120, done on 11/4/2023.  TriGlycerides 119, done on 11/4/2023.    Cholesterol Goals  Value   Total  =< 200   HDL  = > 45 Men = > 55 Women   LDL   =< 100   Triglycerides  =< 150       Glucose and Hemoglobin A1C  Lab Results   Component Value Date     (H) 11/04/2023     (Normal Fasting Glucose < 100mg/dl )    Nurse Review  Risk factor information and results reviewed with Nurse: Yes    Recommended Follow Up:  Consult your physician regarding::   Final Heart Scan Report;  Discuss potential for Incidental Finding;  Discuss Potential for Score Variance      Recommendations for Change:  Nutrition Changes: Low Saturated Fat;Low Fat Dairy;Increase Fiber    Cholesterol Modification (goal of therapy depends upon your risk):   Increase HDL (Healthy/Good) Normal >45 Men >55 Women;  Decrease LDL (Lousy/Bad) Ideal <100;  Decrease Triglycerides (Ugly) Normal <150     (Today's NON-FASTING Cholestech Values:  Total Cholesterol-164, HDL-40, LDL-103, Triglycerides-102, Glucose-91)    Exercise: Enhance Current Program                   Repeat Heart Scan:   5 years if Calcium Score is 0.0              [facility] Recommended Resources:  Recommended  Resources: Upcoming Classes, Medical Services and Health Library [email or web address];    PV Screening  Recommended PV Screening: Carotids;Abdomen;Ankle-Brachial Index (CELE)    Other Resources:: Educational handouts provided.      Jennifer GODWIN RN        Please Contact the Nurse Heart Line with any Questions or Concerns [phone].

## 2024-03-04 NOTE — PROGRESS NOTES
[facility] Progress Note    HPI  No chief complaint on file.    As per initial visit from 11/1/2023,  \"  Mike Don is a 58 year old male who presents to establish care for epilepsy.  Patient has history of seizures since childhood refractory to medical management. He underwent seizure surgery age 20 at [facility] and was on Lamictal up to 800 mg daily in the past btu relatively well controlled in the past on 400 mg AM / 200 mg PM.  He has previously been seen by [facility] neurology, Dr Aaron and Dr. Benoit but presents here to establish care.      He states when he was younger he had surgery with resection of R temporal lobe.  He states he was off seizure medications after surgery at age 20 but had recurrence when he was older.  He was on Dilantin and phenobarbital and changed to lamotrigine (Lamictal) and has been on 400 mg AM / 200 mg nightly in the past. He states he had COVID a year and a half ago and went up on his own temporarily up to 400 mg bid.      He states he then weaned down to 200 mg bid and has been on this dose for the past year.  He denies any recent seizures but states in the past he has had times where he would have a tingling or weird feeling in his mouth but would not lose consciousness.  He would have this for a minute or two and note this would occur mainly when he was drinking caffeine more frequently and was not sleeping well.  He now goes to sleep ~9 AM and wakes up ~4 AM and denies waking in the middle of the night after wetting the bed or biting his tongue.      He does not recall when his last seizure was and has been doing well overall.  He denies balance issues, nausea,  \"       Patient since last visit has remained on Lamictal at 200 mg bid. He denies any side effects or rash, balance issues, double vision or nausea.  He denies episodes of loss of awareness. He has not had any recent episode of tingling in the mouth or auras of odd tastes, smells or sounds.  He  overall is doing well with respect to seizures.        Incidentally, he has tingling in thumb/ wrists in the past few months.  He feels like he has \"carpal tunnel syndrome,\" but denies dropping objects from hands or having pain in neck radiating to hands. He has not been wearing wrist braces recently but was wearing these in the past.  He states he is planning to have EMG for possible carpal tunnel syndrome at [facility] soon.        Past Medical History:   Diagnosis Date    Allergy     allergies    Cancer (HCC)     melanoma finger at 19 y/o    Chronic tension headaches     Esophageal reflux     Lipid screening 7/20/2013    per NG    Seizure disorder (HCC)      Past Surgical History:   Procedure Laterality Date    BRAIN SURGERY  1985    COLONOSCOPY  07/22/2019    repeat 7/2029    COLONOSCOPY N/A 7/22/2019    Procedure: COLONOSCOPY;  Surgeon: Topher Harper MD;  Location: [facility] ENDOSCOPY    HAND/FINGER SURGERY UNLISTED  2008    HAND/FINGER SURGERY UNLISTED      LRF tendon repair     Family History   Problem Relation Age of Onset    Diabetes Mother     Lipids Mother         Hyperlpidemia    No Known Problems Sister      Social History     Socioeconomic History    Marital status:    Tobacco Use    Smoking status: Never     Passive exposure: Never    Smokeless tobacco: Never   Vaping Use    Vaping Use: Never used   Substance and Sexual Activity    Alcohol use: Yes     Alcohol/week: 5.0 standard drinks of alcohol     Types: 5 Standard drinks or equivalent per week    Drug use: No   Other Topics Concern    Caffeine Concern Yes     Comment: 1 coke a day    Sleep Concern Yes     Comment: trouble staying asleep.    Exercise Yes     Comment: 3 x week    Reaction to local anesthetic No       No Known Allergies      Current Outpatient Medications:     lamoTRIgine 200 MG Oral Tab, Take 1 tablet (200 mg total) by mouth 2 (two) times daily., Disp: 180 tablet, Rfl: 3    omeprazole 20 MG Oral Capsule Delayed  Release, Take 1 capsule (20 mg total) by mouth every morning before breakfast., Disp: , Rfl:     Review of Systems:  No chest pain or palpitations; otherwise as noted in HPI.    Exam:  /80 (BP Location: Left arm, Patient Position: Sitting, Cuff Size: adult)   Pulse 72   Resp 16   Wt 190 lb (86.2 kg)   BMI 30.67 kg/m²   Estimated body mass index is 30.67 kg/m² as calculated from the following:    Height as of 11/3/23: 66\".    Weight as of this encounter: 190 lb (86.2 kg).    Gen: well developed, well nourished, no acute distress  HEENT: normocephalic  Heart; normal S1/S2, regular rate and rhythm  Lungs: clear to auscultation bilaterally  Extremities: no edema, peripheral pulses intact    Neck: supple, full range of motion; no carotid bruits    Fundoscopic Exam: optic discs sharp bilaterally    Neuro:  Mental status:  Orientation: Alert and oriented to person, place, time  Speech Fluent and conversational    CN: PERRL, EOMI with no nystagmus, VFF, smile symmetric, sensation intact, tongue and palate midline, SCM intact, otherwise, CN 2-12 intact  Motor: 5/5 strength throughout, tone normal  DTR: 2+ symmetric throughout, toes downgoing bilaterally, no clonus  Sensory: intact to light touch throughout  Coord: FNF intact with no tremor or dysmetria; rapid alternating movements intact bilaterally  Romberg: absent  Gait: normal casual, heel, toe and tandem gait    Labs:  New    Component      Latest Ref Rng 11/3/2023 11/4/2023   WBC      4.0 - 11.0 x10(3) uL  6.3    RBC      4.30 - 5.70 x10(6)uL  5.08    Hemoglobin      13.0 - 17.5 g/dL  15.7    Hematocrit      39.0 - 53.0 %  46.6    MCV      80.0 - 100.0 fL  91.7    MCH      26.0 - 34.0 pg  30.9    MCHC      31.0 - 37.0 g/dL  33.7    RDW-SD      35.1 - 46.3 fL  42.5    RDW      11.0 - 15.0 %  12.6    Platelet Count      150.0 - 450.0 10(3)uL  253.0    Prelim Neutrophil Abs      1.50 - 7.70 x10 (3) uL  3.95    Neutrophils Absolute      1.50 - 7.70 x10(3) uL  3.95     Lymphocytes Absolute      1.00 - 4.00 x10(3) uL  1.46    Monocytes Absolute      0.10 - 1.00 x10(3) uL  0.60    Eosinophils Absolute      0.00 - 0.70 x10(3) uL  0.14    Basophils Absolute      0.00 - 0.20 x10(3) uL  0.06    Immature Granulocyte Absolute      0.00 - 1.00 x10(3) uL  0.04    Neutrophils %      %  63.2    Lymphocytes %      %  23.4    Monocytes %      %  9.6    Eosinophils %      %  2.2    Basophils %      %  1.0    Immature Granulocyte %      %  0.6    Glucose      70 - 99 mg/dL  110 (H)    Sodium      136 - 145 mmol/L  141    Potassium      3.5 - 5.1 mmol/L  4.3    Chloride      98 - 112 mmol/L  106    Carbon Dioxide, Total      21.0 - 32.0 mmol/L  25.0    ANION GAP      0 - 18 mmol/L  10    BUN      9 - 23 mg/dL  15    CREATININE      0.70 - 1.30 mg/dL  1.03    BUN/CREATININE RATIO      10.0 - 20.0   14.6    CALCIUM      8.7 - 10.4 mg/dL  10.0    CALCULATED OSMOLALITY      275 - 295 mOsm/kg  293    EGFR      >=60 mL/min/1.73m2  85    ALT (SGPT)      10 - 49 U/L  37    AST (SGOT)      <=34 U/L  28    ALKALINE PHOSPHATASE      45 - 117 U/L  81    Total Bilirubin      0.3 - 1.2 mg/dL  0.4    PROTEIN, TOTAL      5.7 - 8.2 g/dL  7.4    Albumin      3.2 - 4.8 g/dL  4.5    Globulin      2.8 - 4.4 g/dL  2.9    A/G Ratio      1.0 - 2.0   1.6    Patient Fasting for CMP?  Yes    Lamotrigine Lvl      2.0 - 20.0 ug/mL 7.7        Prior as noted below    Component      Latest Ref Rng 11/14/2019 12/21/2022   LAMOTRIGINE      3.0 - 15.0 ug/mL 8.4  11.8        Imaging:  No recent pertinent imaging             Impression/ Plan:  Mike Dno is a 58 year old male who originally presented 11/1/2023 to establish care for epilepsy.  Patient has history of seizures since childhood refractory to medical management. He underwent seizure surgery age 20 at [facility] and was on Lamictal up to 800 mg daily in the past but relatively well controlled in the past on 400 mg AM / 200 mg PM.  He has previously been seen by [facility]  neurology, Dr Aaron and Dr. Benoit but presented here to establish care 11/1/2023.        He states when he was younger he had surgery with resection of R temporal lobe.  He states he was off seizure medications after surgery at age 20 but had recurrence when he was older.  He was on Dilantin and phenobarbital and changed to lamotrigine (Lamictal) and has been on 400 mg AM / 200 mg nightly in the past. He states he had COVID a year and a half ago and went up on his own temporarily up to 400 mg bid.      He states he then weaned down to 200 mg bid and has been on this dose for the past year.  He denies any recent seizures but states in the past he has had times where he would have a tingling or weird feeling in his mouth but would not lose consciousness.  He would have this for a minute or two and note this would occur mainly when he was drinking caffeine more frequently and was not sleeping well.      Currently, he is doing well with no seizures reported; levels normal as of 11/3/2023 - continue same dose of lamotrigine 200 mg bid for now    1. Partial symptomatic epilepsy with complex partial seizures, not intractable, without status epilepticus (HCC)  As noted above   - lamoTRIgine 200 MG Oral Tab; Take 1 tablet (200 mg total) by mouth 2 (two) times daily.  Dispense: 180 tablet; Refill: 3    2. Numbness and tingling in both hands  As noted above    Return in about 6 months (around 9/4/2024).    Cesar Gagnon MD, Neurology  [facility]  Pager [phone]  3/4/2024

## 2024-03-04 NOTE — PROGRESS NOTES
GI Discharge Instructions      1/11/2023    During your exam, the physician:    Took a biopsy of Stomach    DIET INSTRUCTIONS:  Resume your regular diet    PRESCRIPTIONS/MEDICATIONS  No new prescriptions given today    A RESPONSIBLE ADULT MUST ACCOMPANY YOU AND DRIVE YOU HOME    You had the following procedure(s) today:   Upper Endoscopy     1. If a biopsy and/or a polyp removal was performed today.  Avoid Nsaids    2. Following sedation, your judgement, perception and coordination are impaired.      Therefore, FOR 24 HOURS:  Don't drive or use heavy equipment.  Don't make important decisions or sign documents.  Don't drink alcohol.  Have someone stay with you, if possible. They can watch for problems and help keep you safe    Please call your physician in the event that you experience any of the following or proceed to the nearest hospital in the event of an emergency:     UPPER ENDOSCOPY:    Difficulty swallowing or breathing  Neck swelling  Excessive pain, you may have mild chest pain or discomfort which should pass within 1-2 hours with the passage of air.  Nausea or Vomiting  Abdominal distention  Fever  A mild sore throat may follow this procedure.  Warm salt-water gargle or lozenges may relieve your discomfort.  ..    If you have any questions or concerns, contact Dr. Derek Pérez.    ADDITIONAL INSTRUCTIONS: Await pathology results       [facility]  Electromyography Consultation      History of Present Illness:    Dear Dr. Salgado,  Thank you for the opportunity to see Mike Don for electrodiagnostic consultation today. As you know the patient is a 58 year old male with a chief complaint of bilateral thumb pain which is sharp and stabbing.  He has had this for over a year.  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  Symptoms do not wake him up at night.  They are mostly present when grasping and lifting objects.      PAST MEDICAL HISTORY:  Past Medical History:   Diagnosis Date    Allergy     allergies    Cancer (HCC)     melanoma finger at 19 y/o    Chronic tension headaches     Esophageal reflux     Lipid screening 7/20/2013    per NG    Seizure disorder (HCC)        SURGICAL HISTORY:  Past Surgical History:   Procedure Laterality Date    BRAIN SURGERY  1985    COLONOSCOPY  07/22/2019    repeat 7/2029    COLONOSCOPY N/A 7/22/2019    Procedure: COLONOSCOPY;  Surgeon: Topher Harper MD;  Location: [facility] ENDOSCOPY    HAND/FINGER SURGERY UNLISTED  2008    HAND/FINGER SURGERY UNLISTED      LRF tendon repair       SOCIAL HISTORY:   Social History     Occupational History    Not on file   Tobacco Use    Smoking status: Never     Passive exposure: Never    Smokeless tobacco: Never   Vaping Use    Vaping Use: Never used   Substance and Sexual Activity    Alcohol use: Yes     Alcohol/week: 5.0 standard drinks of alcohol     Types: 5 Standard drinks or equivalent per week    Drug use: No    Sexual activity: Not on file       FAMILY HISTORY:   Family History   Problem Relation Age of Onset    Diabetes Mother     Lipids Mother         Hyperlpidemia    No Known Problems Sister        CURRENT MEDICATIONS:   Current Outpatient Medications   Medication Sig Dispense Refill    lamoTRIgine 200 MG Oral Tab Take 1 tablet (200 mg total) by mouth 2 (two) times daily. 180 tablet 3     omeprazole 20 MG Oral Capsule Delayed Release Take 1 capsule (20 mg total) by mouth every morning before breakfast.         PHYSICAL EXAM:   There were no vitals taken for this visit.    There is no height or weight on file to calculate BMI.      General: No immediate distress   Extremities: peripheral pulses intact, no lower extremity edema bilaterally   Skin: No lesions noted.   Wrists: Full range of motion, no pain with thumb circumduction, no tenderness over the first dorsal compartment.  Neuro:   Strength: Upper extremities have 5/5 strength  Muscle bulk: No atrophy  Sensation: Normal upper extremities  Reflexes: Normal upper extremities  Tinel's sign: Negative bilaterally    EMG/NCV  Sensory Nerve Conduction Study       Nerve / Sites Peak Lat Amp Segments Distance Peak Diff    ms µV  cm ms   R Median - Dig III (Antidromic)      Wrist 3.3 19.7 Wrist - Dig III 14       Palm 1.6 20.9 Palm - Dig III 7    L Median - Dig III (Antidromic)      Wrist 3.3 15.7 Wrist - Dig III 14       Palm 1.7 20.5 Palm - Dig III 7    R Ulnar - Dig V (Antidromic)      Wrist 3.1 20.2 Wrist - Dig V 14    L Ulnar - Dig V (Antidromic)      Wrist 3.0 19.4 Wrist - Dig V 14    R Median, Ulnar - Transcarpal comparison      Median Palm 2.0 105.8 Median Palm - Wrist 8       Ulnar Palm 1.8 25.6 Ulnar Palm - Wrist 8       Median Palm - Ulnar Palm  0.2       Motor Nerve Conduction Study       Nerve / Sites Latency Amplitude Rel Amp Dur. Dur. Segments Distance Velocity Area Area    ms mV % ms %  cm m/s mVms %   R Median - APB      Wrist 3.8 7.5  6.2 100 Wrist - APB 8  25.4 100      Elbow 7.2 7.4 97.9 7.0 113 Elbow - Wrist 19.5 57 26.3 103   L Median - APB      Wrist 3.9 7.1  5.4 100 Wrist - APB 8  18.7 100      Elbow 7.5 6.9 97.1 5.9 110 Elbow - Wrist 19 53 20.3 109   R Ulnar - ADM      Wrist 3.2 15.3 100 6.7 100 Wrist - ADM 8  50.4 100      B.Elbow 6.3 14.6 95.4 7.1 107 B.Elbow - Wrist 21 69 49.7 98.5   L Ulnar - ADM      Wrist 2.9 12.7 100 6.2 100  Wrist - ADM 8  41.1 100      B.Elbow 6.3 11.6 90.7 6.5 104 B.Elbow - Wrist 20.5 60 37.9 92.3       EMG Summary Table     Spontaneous MUAP Recruitment   Muscle IA Fib PSW Fasc Comments Amp Dur. PPP Pattern   R. Deltoid N None None None None N N N N   R. Triceps brachii N None None None None N N N N   R. Biceps brachii N None None None None N N N N   R. Flexor carpi radialis N None None None None N N N N   R. First dorsal interosseous N None None None None N N N N   R. Abductor pollicis brevis N None None None None N N N N   L. Deltoid N None None None None N N N N   L. Triceps brachii N None None None None N N N N   L. Biceps brachii N None None None None N N N N   L. Flexor carpi radialis N None None None None N N N N   L. First dorsal interosseous N None None None None N N N N   L. Abductor pollicis brevis N None None None None N N N N                         Findings: Extremities were warmed with hot packs for 15 minutes prior to testing.  Sensory nerve conduction studies revealed normal and bilaterally symmetric median and ulnar responses.  Motor nerve conduction studies revealed bilaterally symmetric median and ulnar responses with normal latencies, amplitudes and conduction velocities.  Left transcarpal orthodromic mixed nerve studies comparing median and ulnar nerves were normal.  Needle EMG of the bilateral upper extremities was normal in all muscles tested.  Please see EMG summary table above.    Impression:  1.  Normal study.  2.  No electrodiagnostic evidence of median neuropathy at the wrist bilaterally.  3.  No electrodiagnostic evidence of cervical radiculopathy bilaterally      ASSESSMENT AND PLAN:  1. Pain in both hands  The patient has a normal EMG.  His hand pain does not appear to be neurological in etiology, consider an orthopedic source.        Thank you for the opportunity to participate in the care of this patient.  Sincerely,    Heri Waldron M.D.  Diplomate American Board of Physical  Medicine and Rehabilitation

## 2024-03-09 DIAGNOSIS — G40.209 PARTIAL SYMPTOMATIC EPILEPSY WITH COMPLEX PARTIAL SEIZURES, NOT INTRACTABLE, WITHOUT STATUS EPILEPTICUS (HCC): ICD-10-CM

## 2024-03-09 RX ORDER — LAMOTRIGINE 200 MG/1
200 TABLET ORAL 2 TIMES DAILY
Qty: 180 TABLET | Refills: 3 | Status: CANCELLED | OUTPATIENT
Start: 2024-03-09

## 2024-03-11 NOTE — TELEPHONE ENCOUNTER
Lamotrigine 200mg refilled 3/4/24 #180/3R to requested pharmacy.    [facility] due of above

## 2024-03-26 ENCOUNTER — TELEPHONE (OUTPATIENT)
Dept: SURGERY | Facility: CLINIC | Age: 59
End: 2024-03-26

## 2024-03-26 NOTE — TELEPHONE ENCOUNTER
Spoke with pt made aware Dr. Cruz has reviewed EMG results and does not require surgery at this time. Recommends to schedule an appointment with physiatry for treatment [phone]. Pt verbalized understanding

## 2024-04-15 ENCOUNTER — HOSPITAL ENCOUNTER (OUTPATIENT)
Dept: GENERAL RADIOLOGY | Age: 59
Discharge: HOME | End: 2024-04-15
Attending: PHYSICAL MEDICINE & REHABILITATION
Payer: COMMERCIAL

## 2024-04-15 ENCOUNTER — OFFICE VISIT (OUTPATIENT)
Dept: PHYSICAL MEDICINE AND REHAB | Facility: CLINIC | Age: 59
End: 2024-04-15
Payer: COMMERCIAL

## 2024-04-15 VITALS — BODY MASS INDEX: 29.73 KG/M2 | WEIGHT: 185 LBS | HEIGHT: 66 IN

## 2024-04-15 DIAGNOSIS — M18.0 ARTHRITIS OF CARPOMETACARPAL (CMC) JOINT OF BOTH THUMBS: ICD-10-CM

## 2024-04-15 DIAGNOSIS — K21.9 GASTROESOPHAGEAL REFLUX DISEASE WITHOUT ESOPHAGITIS: ICD-10-CM

## 2024-04-15 DIAGNOSIS — M18.0 ARTHRITIS OF CARPOMETACARPAL (CMC) JOINT OF BOTH THUMBS: Primary | ICD-10-CM

## 2024-04-15 PROCEDURE — 73140 X-RAY EXAM OF FINGER(S): CPT | Performed by: PHYSICAL MEDICINE & REHABILITATION

## 2024-04-15 PROCEDURE — 99214 OFFICE O/P EST MOD 30 MIN: CPT | Performed by: PHYSICAL MEDICINE & REHABILITATION

## 2024-04-15 NOTE — PROGRESS NOTES
[facility]  Progress Note    CHIEF COMPLAINT:    Chief Complaint   Patient presents with    Hand Pain     New patient presents with CTS. Patient numbness, tingling and sharp pain bilateral for 8 months. Patient states that his pain comes and goes.  Patient doesn't take any medications for his pain.       History of Present Illness:  Mike Don is a 58 year old male who is being seen in consultation at the request of Dr. Cruz.  I previously saw the patient for an EMG in early March which was normal.  She has history of bilateral thumb pain described as sharp and stabbing.  He is a  for [employer].  He has a history of brain surgery, seizure disorder and melanoma of the finger.    PAST MEDICAL HISTORY:  Past Medical History:    Allergy    allergies    Cancer (HCC)    melanoma finger at 17 y/o    Chronic tension headaches    Esophageal reflux    Lipid screening    per     Seizure disorder (HCC)       SURGICAL HISTORY:  Past Surgical History:   Procedure Laterality Date    Brain surgery  1985    Colonoscopy  07/22/2019    repeat 7/2029    Colonoscopy N/A 7/22/2019    Procedure: COLONOSCOPY;  Surgeon: Topher Harper MD;  Location: [facility] ENDOSCOPY    Hand/finger surgery unlisted  2008    Hand/finger surgery unlisted      LRF tendon repair       SOCIAL HISTORY:   Social History     Occupational History    Not on file   Tobacco Use    Smoking status: Never     Passive exposure: Never    Smokeless tobacco: Never   Vaping Use    Vaping status: Never Used   Substance and Sexual Activity    Alcohol use: Yes     Alcohol/week: 5.0 standard drinks of alcohol     Types: 5 Standard drinks or equivalent per week    Drug use: No    Sexual activity: Not on file       CURRENT MEDICATIONS:   Current Outpatient Medications   Medication Sig Dispense Refill    lamoTRIgine 200 MG Oral Tab Take 1 tablet (200 mg total) by mouth 2 (two) times daily. 180 tablet 3     omeprazole 20 MG Oral Capsule Delayed Release Take 1 capsule (20 mg total) by mouth every morning before breakfast.         ALLERGIES:   No Known Allergies    REVIEW OF SYSTEMS:   No patient-reported data collected this visit.            PHYSICAL EXAM:   Ht 66\"   Wt 185 lb (83.9 kg)   BMI 29.86 kg/m²     Body mass index is 29.86 kg/m².      General: No immediate distress  Head: Normocephalic/ Atraumatic  Extremities: No upper extremity edema bilaterally. Peripheral pulses intact.  Hands: Multi joint arthritic changes, painful with CMC circumduction, MCP joints of the thumbs also painful with limited range of motion  Neuro:   Cognition: alert & oriented x 3, attentive, able to follow 2 step commands, comprehention intact, spontaneous speech intact  Strength: Upper extremities have 5/5 strength  Sensation: Normal upper extremities  Reflexes: Normal upper extremities  Spurling's sign: neg    Data  EMG March 5, 2024:  Impression:  1.  Normal study.  2.  No electrodiagnostic evidence of median neuropathy at the wrist bilaterally.  3.  No electrodiagnostic evidence of cervical radiculopathy bilaterally     Radiology Imaging:  None for this condition    ASSESSMENT AND PLAN:  1. Arthritis of carpometacarpal (CMC) joint of both thumbs  He works with his hands, and given chronicity will obtain x-rays.  Will do occupational therapy.  If no better consider CMC injections.  In the meantime he will use Voltaren gel.  - XR FINGER(S) (MIN 2 VIEWS), LEFT THUMB (CPT=73140); Future  - XR FINGER(S) (MIN 2 VIEWS), RIGHT THUMB (CPT=73140); Future  - Occupational Therapy Referral - External    2.  GERD  Avoiding NSAIDs, limits treatment options.    RTC: 4 to 6 weeks      The patient was in agreement with the assessment and plan.  All questions were answered.         Heri Waldron MD  Physical Medicine and Rehabilitation/Sports Medicine  [facility]

## 2024-04-18 PROBLEM — M18.0 ARTHRITIS OF CARPOMETACARPAL (CMC) JOINT OF BOTH THUMBS: Status: ACTIVE | Noted: 2024-04-18

## 2024-04-26 ENCOUNTER — MED REC SCAN ONLY (OUTPATIENT)
Dept: PHYSICAL MEDICINE AND REHAB | Facility: CLINIC | Age: 59
End: 2024-04-26

## 2024-05-03 ENCOUNTER — MED REC SCAN ONLY (OUTPATIENT)
Dept: PHYSICAL MEDICINE AND REHAB | Facility: CLINIC | Age: 59
End: 2024-05-03

## 2024-06-03 ENCOUNTER — MED REC SCAN ONLY (OUTPATIENT)
Dept: PHYSICAL MEDICINE AND REHAB | Facility: CLINIC | Age: 59
End: 2024-06-03

## 2024-06-03 ENCOUNTER — OFFICE VISIT (OUTPATIENT)
Dept: PHYSICAL MEDICINE AND REHAB | Facility: CLINIC | Age: 59
End: 2024-06-03
Payer: COMMERCIAL

## 2024-06-03 VITALS — BODY MASS INDEX: 30 KG/M2 | WEIGHT: 186 LBS

## 2024-06-03 DIAGNOSIS — M18.0 ARTHRITIS OF CARPOMETACARPAL (CMC) JOINT OF BOTH THUMBS: Primary | ICD-10-CM

## 2024-06-03 DIAGNOSIS — K21.9 GASTROESOPHAGEAL REFLUX DISEASE WITHOUT ESOPHAGITIS: ICD-10-CM

## 2024-06-03 PROCEDURE — 99213 OFFICE O/P EST LOW 20 MIN: CPT | Performed by: PHYSICAL MEDICINE & REHABILITATION

## 2024-06-03 NOTE — PROGRESS NOTES
[facility]  Progress Note    CHIEF COMPLAINT:    Chief Complaint   Patient presents with    Follow - Up     04/15/24 LOV. He did OT and states 80% improvement. No pain. Denies t/n. No pain meds.        History of Present Illness:  Mike Don is a 58 year old male who presents today for follow up for symptoms of bilateral CMC arthritis.  He has been doing occupational therapy.  The left hand feels perfect, the right hand is 80% better.  No pain, can do all activities of daily living including his job.    PAST MEDICAL HISTORY:  Past Medical History:    Allergy    allergies    Cancer (HCC)    melanoma finger at 17 y/o    Chronic tension headaches    Esophageal reflux    Lipid screening    per     Seizure disorder (HCC)       SURGICAL HISTORY:  Past Surgical History:   Procedure Laterality Date    Brain surgery  1985    Colonoscopy  07/22/2019    repeat 7/2029    Colonoscopy N/A 7/22/2019    Procedure: COLONOSCOPY;  Surgeon: Topher Harper MD;  Location: [facility] ENDOSCOPY    Hand/finger surgery unlisted  2008    Hand/finger surgery unlisted      LRF tendon repair       SOCIAL HISTORY:   Social History     Occupational History    Not on file   Tobacco Use    Smoking status: Never     Passive exposure: Never    Smokeless tobacco: Never   Vaping Use    Vaping status: Never Used   Substance and Sexual Activity    Alcohol use: Yes     Alcohol/week: 5.0 standard drinks of alcohol     Types: 5 Standard drinks or equivalent per week    Drug use: No    Sexual activity: Not on file       CURRENT MEDICATIONS:   Current Outpatient Medications   Medication Sig Dispense Refill    lamoTRIgine 200 MG Oral Tab Take 1 tablet (200 mg total) by mouth 2 (two) times daily. 180 tablet 3    omeprazole 20 MG Oral Capsule Delayed Release Take 1 capsule (20 mg total) by mouth every morning before breakfast.         ALLERGIES:   No Known Allergies      PHYSICAL EXAM:   Wt 186 lb (84.4 kg)   Spoke with patient. Lipitor ordered. Labs reordered.     BMI 30.02 kg/m²     Body mass index is 30.02 kg/m².      General: No immediate distress  Hands: Negative bilateral CMC circumduction, negative Finkelstein bilaterally  Neuro:   Cognition: alert & oriented x 3, attentive, comprehention intact, spontaneous speech intact  Strength:  Upper extremities have 5/5 strength  Sensation: Normal upper extremities          Data  EMG March 5, 2024:  Impression:  1.  Normal study.  2.  No electrodiagnostic evidence of median neuropathy at the wrist bilaterally.  3.  No electrodiagnostic evidence of cervical radiculopathy bilaterally    Radiology Imaging:  I personally reviewed a plain film x-ray of the left thumb showing CMC arthritis, carpal arthritis and wrist arthritis, posttraumatic per the radiologist report.   I personally reviewed a plain film x-ray of the right thumb showing CMC and MCP arthritis.     ASSESSMENT AND PLAN:  1. Arthritis of carpometacarpal (CMC) joint of both thumbs  Much improved.  We discussed using Voltaren gel.  He will do occupational therapy once a week for the next 3 to 4 weeks then continue with home exercises.  Return as needed.  - Occupational Therapy Referral - External    2. Gastroesophageal reflux disease without esophagitis  No NSAIDs, limits treatment options              The patient was in agreement with the assessment and plan.  All questions were answered.        Heri Waldron MD  Physical Medicine and Rehabilitation/Sports Medicine  [facility]

## 2024-08-31 ENCOUNTER — OFFICE VISIT (OUTPATIENT)
Dept: DERMATOLOGY CLINIC | Facility: CLINIC | Age: 59
End: 2024-08-31

## 2024-08-31 DIAGNOSIS — D22.9 MULTIPLE NEVI: ICD-10-CM

## 2024-08-31 DIAGNOSIS — Z12.83 SCREENING EXAM FOR SKIN CANCER: Primary | ICD-10-CM

## 2024-08-31 DIAGNOSIS — B07.9 VERRUCA(E): ICD-10-CM

## 2024-08-31 NOTE — PROGRESS NOTES
HPI:    Patient ID: Mike Don is a 59 year old male.    Patient presents for full body exam. Patient has hx of melanoma on the left 5th digit finger when he was 18. Patient has spot of concern on right cheek for the past 2-3 weeks. Spot is raised and a bit dark. No draining or tenderness noted. No allergies to medications noted.         Review of Systems   Constitutional:  Negative for chills and fever.   Musculoskeletal:  Negative for arthralgias and myalgias.   Skin:  Positive for rash. Negative for color change and wound.            Current Outpatient Medications   Medication Sig Dispense Refill    lamoTRIgine 200 MG Oral Tab Take 1 tablet (200 mg total) by mouth 2 (two) times daily. 180 tablet 3    omeprazole 20 MG Oral Capsule Delayed Release Take 1 capsule (20 mg total) by mouth every morning before breakfast.       Allergies:No Known Allergies   There were no vitals taken for this visit.  There is no height or weight on file to calculate BMI.  PHYSICAL EXAM:   Physical Exam  Constitutional:       General: He is not in acute distress.     Appearance: Normal appearance.   Skin:     General: Skin is warm and dry.      Findings: Rash present.      Comments: Multiple nevi noted throughout the body. No draining or tenderness noted. No scaling noted. No erythema noted.     Wart noted on the right cheek. No draining or tenderness noted. No scaling noted. No erythema noted.    Neurological:      Mental Status: He is alert and oriented to person, place, and time.                ASSESSMENT/PLAN:   1. Screening exam for skin cancer  -After discussion with patient, advised the following:  Reassurance regarding other benign skin lesions. Signs and symptoms of skin cancer, ABCDE's of melanoma discussed with patient. Sunscreen use, sun protection, self exams reviewed. Followup as noted RTC ---routine checkup 6 mos -one year or p.r.n.   -Pt was agreeable to plan and will comply with discussion above.     2. Multiple  nevi  -After discussion with patient, advised the following:  -Benign lesions  -Observe for now  -Return if there are changes.   -Went over ABCDE changes with the patient.   -Encouraged sun protection.   -To call or follow-up with worsening symptoms or concerns.   -Pt was agreeable to plan and will comply with discussion above.     3. Verrucae  -After discussion with patient, advised the following:  - Cryosurgery of non-malignant lesion(s)  - Risks, benefits, alternatives and personnel required for cryosurgery reviewed with patient. Discussed the expected redness, as well as the risks of swelling, blistering, crusting, pigmentary changes, and permanent scarring. . Discussed that lesion may need additional treatments in future or may recur. Pt verbalizes understanding and wishes to proceed.   - Cryosurgery performed with Liquid Nitrogen via cryostat spray gun to Warts. 1 lesion(s) treated.   - Patient tolerated well and wound care discussed.   -Pt was agreeable to plan and will comply with discussion above.             No orders of the defined types were placed in this encounter.      Meds This Visit:  Requested Prescriptions      No prescriptions requested or ordered in this encounter       Imaging & Referrals:  None         ID#[id number]

## 2024-10-01 NOTE — TELEPHONE ENCOUNTER
Spoke to patient , states he got letter from [employer] prescription will not be refilled. [facility] has 1 month supply of lamotrigine left.    Patient will contact his pharmacy for further information, patient will recontact office if prescription is denied PAST SURGICAL HISTORY:  No significant past surgical history

## 2024-10-30 ENCOUNTER — OFFICE VISIT (OUTPATIENT)
Dept: INTERNAL MEDICINE CLINIC | Facility: CLINIC | Age: 59
End: 2024-10-30
Payer: COMMERCIAL

## 2024-10-30 ENCOUNTER — OFFICE VISIT (OUTPATIENT)
Dept: NEUROLOGY | Facility: CLINIC | Age: 59
End: 2024-10-30
Payer: COMMERCIAL

## 2024-10-30 VITALS
DIASTOLIC BLOOD PRESSURE: 70 MMHG | HEIGHT: 66 IN | WEIGHT: 190 LBS | HEART RATE: 70 BPM | RESPIRATION RATE: 16 BRPM | BODY MASS INDEX: 30.53 KG/M2 | SYSTOLIC BLOOD PRESSURE: 98 MMHG

## 2024-10-30 VITALS
HEART RATE: 76 BPM | DIASTOLIC BLOOD PRESSURE: 80 MMHG | WEIGHT: 190.56 LBS | BODY MASS INDEX: 30.63 KG/M2 | SYSTOLIC BLOOD PRESSURE: 120 MMHG | HEIGHT: 66 IN

## 2024-10-30 DIAGNOSIS — G40.909 SEIZURE DISORDER (HCC): ICD-10-CM

## 2024-10-30 DIAGNOSIS — G40.209 PARTIAL SYMPTOMATIC EPILEPSY WITH COMPLEX PARTIAL SEIZURES, NOT INTRACTABLE, WITHOUT STATUS EPILEPTICUS (HCC): Primary | ICD-10-CM

## 2024-10-30 DIAGNOSIS — Z00.00 ANNUAL PHYSICAL EXAM: Primary | ICD-10-CM

## 2024-10-30 DIAGNOSIS — Z85.820 HISTORY OF MELANOMA: ICD-10-CM

## 2024-10-30 DIAGNOSIS — Z12.11 COLON CANCER SCREENING: ICD-10-CM

## 2024-10-30 DIAGNOSIS — K21.9 GASTROESOPHAGEAL REFLUX DISEASE, UNSPECIFIED WHETHER ESOPHAGITIS PRESENT: ICD-10-CM

## 2024-10-30 DIAGNOSIS — Z12.5 PROSTATE CANCER SCREENING: ICD-10-CM

## 2024-10-30 PROCEDURE — 90656 IIV3 VACC NO PRSV 0.5 ML IM: CPT | Performed by: INTERNAL MEDICINE

## 2024-10-30 PROCEDURE — 99213 OFFICE O/P EST LOW 20 MIN: CPT | Performed by: OTHER

## 2024-10-30 PROCEDURE — 99396 PREV VISIT EST AGE 40-64: CPT | Performed by: INTERNAL MEDICINE

## 2024-10-30 PROCEDURE — 90471 IMMUNIZATION ADMIN: CPT | Performed by: INTERNAL MEDICINE

## 2024-10-30 NOTE — PROGRESS NOTES
Subjective:     Patient ID: Mike Don is a 59 year old male.    Patient present today for his annual physical.         History/Other:   Review of Systems   Constitutional: Negative.    HENT: Negative.     Eyes: Negative.    Respiratory: Negative.     Cardiovascular: Negative.  Negative for chest pain, palpitations and leg swelling.        No pnd   Gastrointestinal: Negative.         No hematemesis  No dysphagia   Genitourinary: Negative.         No nocturia   Allergic/Immunologic: Negative for environmental allergies, food allergies and immunocompromised state.   Neurological:  Negative for syncope.   Hematological: Negative.      Current Outpatient Medications   Medication Sig Dispense Refill    lamoTRIgine 200 MG Oral Tab Take 1 tablet (200 mg total) by mouth 2 (two) times daily. 180 tablet 3    omeprazole 20 MG Oral Capsule Delayed Release Take 1 capsule (20 mg total) by mouth every morning before breakfast.       Allergies:Allergies[1]    Past Medical History:    Allergy    allergies    Cancer (HCC)    melanoma finger at 19 y/o    Chronic tension headaches    Esophageal reflux    Lipid screening    per     Seizure disorder (HCC)      Past Surgical History:   Procedure Laterality Date    Brain surgery  1985    Colonoscopy  07/22/2019    repeat 7/2029    Colonoscopy N/A 7/22/2019    Procedure: COLONOSCOPY;  Surgeon: Topher Harper MD;  Location: [facility] ENDOSCOPY    Hand/finger surgery unlisted  2008    Hand/finger surgery unlisted      LRF tendon repair      Family History   Problem Relation Age of Onset    Diabetes Mother     Lipids Mother         Hyperlpidemia    No Known Problems Sister       Social History:   Social History     Socioeconomic History    Marital status:    Tobacco Use    Smoking status: Never     Passive exposure: Never    Smokeless tobacco: Never   Vaping Use    Vaping status: Never Used   Substance and Sexual Activity    Alcohol use: Yes     Alcohol/week: 5.0 standard drinks  of alcohol    Drug use: No   Other Topics Concern    Caffeine Concern Yes     Comment: 1 coke a day    Sleep Concern Yes     Comment: trouble staying asleep.    Exercise Yes     Comment: 3 x week    Reaction to local anesthetic No    Pt has a pacemaker No    Pt has a defibrillator No        Objective:   Physical Exam  Constitutional:       General: He is not in acute distress.     Appearance: He is well-developed. He is obese. He is not ill-appearing, toxic-appearing or diaphoretic.   HENT:      Head: Normocephalic and atraumatic.      Right Ear: Tympanic membrane, ear canal and external ear normal.      Left Ear: Tympanic membrane, ear canal and external ear normal.      Nose: Nose normal.      Mouth/Throat:      Pharynx: No oropharyngeal exudate.   Eyes:      General: No scleral icterus.        Right eye: No discharge.         Left eye: No discharge.      Conjunctiva/sclera: Conjunctivae normal.      Pupils: Pupils are equal, round, and reactive to light.   Neck:      Thyroid: No thyromegaly.      Vascular: No carotid bruit or JVD.   Cardiovascular:      Rate and Rhythm: Normal rate and regular rhythm.      Pulses: Normal pulses.      Heart sounds: Normal heart sounds. No murmur heard.     No friction rub. No gallop.   Pulmonary:      Effort: Pulmonary effort is normal. No respiratory distress.      Breath sounds: Normal breath sounds. No wheezing or rales.   Abdominal:      General: Abdomen is flat. Bowel sounds are normal. There is no distension.      Palpations: Abdomen is soft. There is no mass.      Tenderness: There is no abdominal tenderness. There is no guarding or rebound.   Genitourinary:     Prostate: Normal. Not enlarged, not tender and no nodules present.      Rectum: Normal. No mass, tenderness, anal fissure or external hemorrhoid. Normal anal tone.   Musculoskeletal:         General: Normal range of motion.      Cervical back: Normal range of motion and neck supple. No rigidity or tenderness.       Right lower leg: No edema.      Left lower leg: No edema.   Lymphadenopathy:      Cervical: No cervical adenopathy.   Skin:     General: Skin is warm and dry.      Coloration: Skin is not jaundiced or pale.      Findings: No rash.   Neurological:      Mental Status: He is alert and oriented to person, place, and time.   Psychiatric:         Mood and Affect: Mood normal.         Assessment & Plan:   (Z00.00) Annual physical exam  (primary encounter diagnosis)  Plan: CBC With Differential With Platelet, Comp         Metabolic Panel (14), Lipid Panel, PSA, TOTAL         [5363] [Q]        Routine labs ordered. Flushot given today.     (K21.9) Gastroesophageal reflux disease, unspecified whether esophagitis present  Plan: pt stable on PPI     (G40.909) Seizure disorder (HCC)  Plan: had been controlled and pt continues to ffup with neuro.     (Z85.820) History of melanoma  Plan: no recurrence; he continues to see derm annually for his surveillance.     (Z12.5) Prostate cancer screening  Plan: check psa.     (Z12.11) Colon cancer screening  Plan:  had his colonoscopy in 2019 normal and next in 2029.       No orders of the defined types were placed in this encounter.      Meds This Visit:  Requested Prescriptions      No prescriptions requested or ordered in this encounter       Imaging & Referrals:  INFLUENZA VACCINE, TRI, PRESERV FREE, 0.5 ML          [1] No Known Allergies

## 2024-10-30 NOTE — PATIENT INSTRUCTIONS
Refill policies:    Allow 2-3 business days for refills; controlled substances may take longer.  Contact your pharmacy at least 5 days prior to running out of medication and have them send an electronic request or submit request through the “request refill” option in your [employer] account.  Refills are not addressed on weekends; covering physicians do not authorize routine medications on weekends.  No narcotics or controlled substances are refilled after noon on Fridays or by on call physicians.  By law, narcotics must be electronically prescribed.  A 30 day supply with no refills is the maximum allowed.  If your prescription is due for a refill, you may be due for a follow up appointment.  To best provide you care, patients receiving routine medications need to be seen at least once a year.  Patients receiving narcotic/controlled substance medications need to be seen at least once every 3 months.  In the event that your preferred pharmacy does not have the requested medication in stock (e.g. Backordered), it is your responsibility to find another pharmacy that has the requested medication available.  We will gladly send a new prescription to that pharmacy at your request.    Scheduling Tests:    If your physician has ordered radiology tests such as MRI or CT scans, please contact Central Scheduling at [phone] right away to schedule the test.  Once scheduled, the [facility] Centralized Referral Team will work with your insurance carrier to obtain pre-certification or prior authorization.  Depending on your insurance carrier, approval may take 3-10 days.  It is highly recommended patients assure they have received an authorization before having a test performed.  If test is done without insurance authorization, patient may be responsible for the entire amount billed.      Precertification and Prior Authorizations:  If your physician has recommended that you have a procedure or additional testing performed the [facility]  Centralized Referral Team will contact your insurance carrier to obtain pre-certification or prior authorization.    You are strongly encouraged to contact your insurance carrier to verify that your procedure/test has been approved and is a COVERED benefit.  Although the [facility] Centralized Referral Team does its due diligence, the insurance carrier gives the disclaimer that \"Although the procedure is authorized, this does not guarantee payment.\"    Ultimately the patient is responsible for payment.   Thank you for your understanding in this matter.  Paperwork Completion:  If you require FMLA or disability paperwork for your recovery, please make sure to either drop it off or have it faxed to our office at [phone]. Be sure the form has your name and date of birth on it.  The form will be faxed to our Forms Department and they will complete it for you.  There is a 25$ fee for all forms that need to be filled out.  Please be aware there is a 10-14 day turnaround time.  You will need to sign a release of information (ANNE) form if your paperwork does not come with one.  You may call the Forms Department with any questions at [phone].  Their fax number is [phone].

## 2024-11-02 LAB
ABSOLUTE BASOPHILS: 31 CELLS/UL (ref 0–200)
ABSOLUTE EOSINOPHILS: 107 CELLS/UL (ref 15–500)
ABSOLUTE LYMPHOCYTES: 1193 CELLS/UL (ref 850–3900)
ABSOLUTE MONOCYTES: 398 CELLS/UL (ref 200–950)
ABSOLUTE NEUTROPHILS: 3371 CELLS/UL (ref 1500–7800)
ALBUMIN/GLOBULIN RATIO: 1.6 (CALC) (ref 1–2.5)
ALBUMIN: 4.5 G/DL (ref 3.6–5.1)
ALKALINE PHOSPHATASE: 87 U/L (ref 35–144)
ALT: 29 U/L (ref 9–46)
AST: 24 U/L (ref 10–35)
BASOPHILS: 0.6 %
BILIRUBIN, TOTAL: 0.5 MG/DL (ref 0.2–1.2)
BUN: 17 MG/DL (ref 7–25)
CALCIUM: 9.5 MG/DL (ref 8.6–10.3)
CARBON DIOXIDE: 28 MMOL/L (ref 20–32)
CHLORIDE: 106 MMOL/L (ref 98–110)
CHOL/HDLC RATIO: 4.2 (CALC)
CHOLESTEROL, TOTAL: 194 MG/DL
CREATININE: 0.86 MG/DL (ref 0.7–1.3)
EGFR: 101 ML/MIN/1.73M2
EOSINOPHILS: 2.1 %
GLOBULIN: 2.8 G/DL (CALC) (ref 1.9–3.7)
GLUCOSE: 91 MG/DL (ref 65–99)
HDL CHOLESTEROL: 46 MG/DL
HEMATOCRIT: 47.5 % (ref 38.5–50)
HEMOGLOBIN: 16.1 G/DL (ref 13.2–17.1)
LDL-CHOLESTEROL: 126 MG/DL (CALC)
LYMPHOCYTES: 23.4 %
MCH: 31 PG (ref 27–33)
MCHC: 33.9 G/DL (ref 32–36)
MCV: 91.3 FL (ref 80–100)
MONOCYTES: 7.8 %
MPV: 9.8 FL (ref 7.5–12.5)
NEUTROPHILS: 66.1 %
NON-HDL CHOLESTEROL: 148 MG/DL (CALC)
PLATELET COUNT: 212 THOUSAND/UL (ref 140–400)
POTASSIUM: 4.1 MMOL/L (ref 3.5–5.3)
PROTEIN, TOTAL: 7.3 G/DL (ref 6.1–8.1)
PSA, TOTAL: 1.18 NG/ML
RDW: 12.1 % (ref 11–15)
RED BLOOD CELL COUNT: 5.2 MILLION/UL (ref 4.2–5.8)
SODIUM: 144 MMOL/L (ref 135–146)
TRIGLYCERIDES: 117 MG/DL
WHITE BLOOD CELL COUNT: 5.1 THOUSAND/UL (ref 3.8–10.8)

## 2024-11-07 LAB — LAMOTRIGINE: 6.6 MCG/ML (ref 2.5–15)

## 2025-03-26 ENCOUNTER — OFFICE VISIT (OUTPATIENT)
Dept: NEUROLOGY | Facility: CLINIC | Age: 60
End: 2025-03-26
Payer: COMMERCIAL

## 2025-03-26 VITALS
DIASTOLIC BLOOD PRESSURE: 72 MMHG | HEART RATE: 66 BPM | BODY MASS INDEX: 30.53 KG/M2 | SYSTOLIC BLOOD PRESSURE: 116 MMHG | WEIGHT: 190 LBS | HEIGHT: 66 IN | RESPIRATION RATE: 16 BRPM

## 2025-03-26 DIAGNOSIS — G40.209 PARTIAL SYMPTOMATIC EPILEPSY WITH COMPLEX PARTIAL SEIZURES, NOT INTRACTABLE, WITHOUT STATUS EPILEPTICUS (HCC): ICD-10-CM

## 2025-03-26 PROCEDURE — 99213 OFFICE O/P EST LOW 20 MIN: CPT | Performed by: OTHER

## 2025-03-26 RX ORDER — LAMOTRIGINE 200 MG/1
200 TABLET ORAL 2 TIMES DAILY
Qty: 180 TABLET | Refills: 3 | Status: SHIPPED | OUTPATIENT
Start: 2025-03-26

## 2025-03-26 NOTE — PATIENT INSTRUCTIONS
Refill policies:    Allow 2-3 business days for refills; controlled substances may take longer.  Contact your pharmacy at least 5 days prior to running out of medication and have them send an electronic request or submit request through the “request refill” option in your [employer] account.  Refills are not addressed on weekends; covering physicians do not authorize routine medications on weekends.  No narcotics or controlled substances are refilled after noon on Fridays or by on call physicians.  By law, narcotics must be electronically prescribed.  A 30 day supply with no refills is the maximum allowed.  If your prescription is due for a refill, you may be due for a follow up appointment.  To best provide you care, patients receiving routine medications need to be seen at least once a year.  Patients receiving narcotic/controlled substance medications need to be seen at least once every 3 months.  In the event that your preferred pharmacy does not have the requested medication in stock (e.g. Backordered), it is your responsibility to find another pharmacy that has the requested medication available.  We will gladly send a new prescription to that pharmacy at your request.    Scheduling Tests:    If your physician has ordered radiology tests such as MRI or CT scans, please contact Central Scheduling at [phone] right away to schedule the test.  Once scheduled, the [facility] Centralized Referral Team will work with your insurance carrier to obtain pre-certification or prior authorization.  Depending on your insurance carrier, approval may take 3-10 days.  It is highly recommended patients assure they have received an authorization before having a test performed.  If test is done without insurance authorization, patient may be responsible for the entire amount billed.      Precertification and Prior Authorizations:  If your physician has recommended that you have a procedure or additional testing performed the [facility]  Centralized Referral Team will contact your insurance carrier to obtain pre-certification or prior authorization.    You are strongly encouraged to contact your insurance carrier to verify that your procedure/test has been approved and is a COVERED benefit.  Although the [facility] Centralized Referral Team does its due diligence, the insurance carrier gives the disclaimer that \"Although the procedure is authorized, this does not guarantee payment.\"    Ultimately the patient is responsible for payment.   Thank you for your understanding in this matter.  Paperwork Completion:  If you require FMLA or disability paperwork for your recovery, please make sure to either drop it off or have it faxed to our office at [phone]. Be sure the form has your name and date of birth on it.  The form will be faxed to our Forms Department and they will complete it for you.  There is a 25$ fee for all forms that need to be filled out.  Please be aware there is a 10-14 day turnaround time.  You will need to sign a release of information (ANNE) form if your paperwork does not come with one.  You may call the Forms Department with any questions at [phone].  Their fax number is [phone].

## 2025-03-26 NOTE — PROGRESS NOTES
[facility] Progress Note    HPI  Chief Complaint   Patient presents with    Seizures     Follow up and no recent seizures    Test Results     Lab on 11/01/2024     As per initial visit from 11/1/2023,  \"  Mike Don is a 59 year old male who presents to establish care for epilepsy.  Patient has history of seizures since childhood refractory to medical management. He underwent seizure surgery age 20 at [facility] and was on Lamictal up to 800 mg daily in the past btu relatively well controlled in the past on 400 mg AM / 200 mg PM.  He has previously been seen by [facility] neurology, Dr Aaron and Dr. Benoit but presents here to establish care.      He states when he was younger he had surgery with resection of R temporal lobe.  He states he was off seizure medications after surgery at age 20 but had recurrence when he was older.  He was on Dilantin and phenobarbital and changed to lamotrigine (Lamictal) and has been on 400 mg AM / 200 mg nightly in the past. He states he had COVID a year and a half ago and went up on his own temporarily up to 400 mg bid.      He states he then weaned down to 200 mg bid and has been on this dose for the past year.  He denies any recent seizures but states in the past he has had times where he would have a tingling or weird feeling in his mouth but would not lose consciousness.  He would have this for a minute or two and note this would occur mainly when he was drinking caffeine more frequently and was not sleeping well.  He now goes to sleep ~9 AM and wakes up ~4 AM and denies waking in the middle of the night after wetting the bed or biting his tongue.      He does not recall when his last seizure was and has been doing well overall.  He denies balance issues, nausea,  \"       Prior notes as per 3/4/2024. Patient since last visit has remained on Lamictal at 200 mg bid. He denies any side effects or rash, balance issues, double vision or nausea.  He denies episodes of  loss of awareness. He has not had any recent episode of tingling in the mouth or auras of odd tastes, smells or sounds.  He overall is doing well with respect to seizures.        Incidentally, he has tingling in thumb/ wrists in the past few months.  He feels like he has \"carpal tunnel syndrome,\" but denies dropping objects from hands or having pain in neck radiating to hands. He has not been wearing wrist braces recently but was wearing these in the past.  He states he is planning to have EMG for possible carpal tunnel syndrome at [facility] soon.       Prior notes as per 10/30/2024.  Patient last seen 3/4/2024.  He remains on Lamictal 200 mg bid and denies  any side effects of rash, balance issues, double vision or nausea.  He denies episodes of loss of awareness. He has not had any recent episode of tingling in the mouth or auras of odd tastes, smells or sounds.  He overall is doing well with respect to seizures.        He had testing for possible carpal tunnel syndrome with EMG done 3/4/2024 which was normal.       Patient last seen 10/30/2024.   He remains on Lamictal 200 mg bid and denies  any side effects of rash, balance issues, double vision or nausea.  He denies episodes of loss of awareness. He has not had any recent episode of tingling in the mouth or auras of odd tastes, smells or sounds.  He overall is doing well with respect to seizures.         Past Medical History:    Allergy    allergies    Cancer (HCC)    melanoma finger at 17 y/o    Chronic tension headaches    Esophageal reflux    Lipid screening    per     Seizure disorder (HCC)     Past Surgical History:   Procedure Laterality Date    Brain surgery  1985    Colonoscopy  07/22/2019    repeat 7/2029    Colonoscopy N/A 7/22/2019    Procedure: COLONOSCOPY;  Surgeon: Topher Harper MD;  Location: [facility] ENDOSCOPY    Hand/finger surgery unlisted  2008    Hand/finger surgery unlisted      LRF tendon repair     Family History   Problem  Relation Age of Onset    Diabetes Mother     Lipids Mother         Hyperlpidemia    No Known Problems Sister      Social History     Socioeconomic History    Marital status:    Tobacco Use    Smoking status: Never     Passive exposure: Never    Smokeless tobacco: Never   Vaping Use    Vaping status: Never Used   Substance and Sexual Activity    Alcohol use: Yes     Alcohol/week: 5.0 standard drinks of alcohol    Drug use: No   Other Topics Concern    Caffeine Concern Yes     Comment: 1 coke a day    Sleep Concern Yes     Comment: trouble staying asleep.    Exercise Yes     Comment: 3 x week    Reaction to local anesthetic No    Pt has a pacemaker No    Pt has a defibrillator No       No Known Allergies      Current Outpatient Medications:     lamoTRIgine 200 MG Oral Tab, Take 1 tablet (200 mg total) by mouth 2 (two) times daily., Disp: 180 tablet, Rfl: 3    omeprazole 20 MG Oral Capsule Delayed Release, Take 1 capsule (20 mg total) by mouth every morning before breakfast., Disp: , Rfl:     Review of Systems:  No chest pain or palpitations; otherwise as noted in HPI.    Exam:  /72 (BP Location: Left arm, Patient Position: Sitting, Cuff Size: large)   Pulse 66   Resp 16   Ht 66\"   Wt 190 lb (86.2 kg)   BMI 30.67 kg/m²   Estimated body mass index is 30.67 kg/m² as calculated from the following:    Height as of this encounter: 66\".    Weight as of this encounter: 190 lb (86.2 kg).    Gen: well developed, well nourished, no acute distress  HEENT: normocephalic  Heart; normal S1/S2, regular rate and rhythm  Lungs: clear to auscultation bilaterally  Extremities: no edema, peripheral pulses intact    Neck: supple, full range of motion; no carotid bruits    Fundoscopic Exam: optic discs sharp bilaterally    Neuro:  Mental status:  Orientation: Alert and oriented to person, place, time  Speech Fluent and conversational    CN: PERRL, EOMI with no nystagmus, VFF, smile symmetric, sensation intact, tongue and  palate midline, SCM intact, otherwise, CN 2-12 intact  Motor: 5/5 strength throughout, tone normal  DTR: 2+ symmetric throughout, toes downgoing bilaterally, no clonus  Sensory: intact to light touch throughout  Coord: FNF intact with no tremor or dysmetria; rapid alternating movements intact bilaterally  Romberg: absent  Gait: normal casual, heel, toe and tandem gait    Labs:  New    Component      Latest Ref Rng 11/1/2024   LAMOTRIGINE      2.5 - 15.0 mcg/mL 6.6            Prior as noted below    Component      Latest Ref Rng 11/3/2023 11/4/2023   WBC      4.0 - 11.0 x10(3) uL  6.3    RBC      4.30 - 5.70 x10(6)uL  5.08    Hemoglobin      13.0 - 17.5 g/dL  15.7    Hematocrit      39.0 - 53.0 %  46.6    MCV      80.0 - 100.0 fL  91.7    MCH      26.0 - 34.0 pg  30.9    MCHC      31.0 - 37.0 g/dL  33.7    RDW-SD      35.1 - 46.3 fL  42.5    RDW      11.0 - 15.0 %  12.6    Platelet Count      150.0 - 450.0 10(3)uL  253.0    Prelim Neutrophil Abs      1.50 - 7.70 x10 (3) uL  3.95    Neutrophils Absolute      1.50 - 7.70 x10(3) uL  3.95    Lymphocytes Absolute      1.00 - 4.00 x10(3) uL  1.46    Monocytes Absolute      0.10 - 1.00 x10(3) uL  0.60    Eosinophils Absolute      0.00 - 0.70 x10(3) uL  0.14    Basophils Absolute      0.00 - 0.20 x10(3) uL  0.06    Immature Granulocyte Absolute      0.00 - 1.00 x10(3) uL  0.04    Neutrophils %      %  63.2    Lymphocytes %      %  23.4    Monocytes %      %  9.6    Eosinophils %      %  2.2    Basophils %      %  1.0    Immature Granulocyte %      %  0.6    Glucose      70 - 99 mg/dL  110 (H)    Sodium      136 - 145 mmol/L  141    Potassium      3.5 - 5.1 mmol/L  4.3    Chloride      98 - 112 mmol/L  106    Carbon Dioxide, Total      21.0 - 32.0 mmol/L  25.0    ANION GAP      0 - 18 mmol/L  10    BUN      9 - 23 mg/dL  15    CREATININE      0.70 - 1.30 mg/dL  1.03    BUN/CREATININE RATIO      10.0 - 20.0   14.6    CALCIUM      8.7 - 10.4 mg/dL  10.0    CALCULATED  OSMOLALITY      275 - 295 mOsm/kg  293    EGFR      >=60 mL/min/1.73m2  85    ALT (SGPT)      10 - 49 U/L  37    AST (SGOT)      <=34 U/L  28    ALKALINE PHOSPHATASE      45 - 117 U/L  81    Total Bilirubin      0.3 - 1.2 mg/dL  0.4    PROTEIN, TOTAL      5.7 - 8.2 g/dL  7.4    Albumin      3.2 - 4.8 g/dL  4.5    Globulin      2.8 - 4.4 g/dL  2.9    A/G Ratio      1.0 - 2.0   1.6    Patient Fasting for CMP?  Yes    Lamotrigine Lvl      2.0 - 20.0 ug/mL 7.7        Prior as noted below    Component      Latest Ref Rng 11/14/2019 12/21/2022   LAMOTRIGINE      3.0 - 15.0 ug/mL 8.4  11.8        Imaging:  No recent pertinent imaging             Impression/ Plan:  Mike Don is a 59 year old male who originally presented 11/1/2023 to establish care for epilepsy.  Patient has history of seizures since childhood refractory to medical management. He underwent seizure surgery age 20 at [facility] and was on Lamictal up to 800 mg daily in the past but relatively well controlled in the past on 400 mg AM / 200 mg PM.  He has previously been seen by [facility] neurology, Dr Aaron and Dr. Benoit but presented here to establish care 11/1/2023.        He states when he was younger he had surgery with resection of R temporal lobe.  He states he was off seizure medications after surgery at age 20 but had recurrence when he was older.  He was on Dilantin and phenobarbital and changed to lamotrigine (Lamictal) and has been on 400 mg AM / 200 mg nightly in the past. He states he had COVID a year and a half ago and went up on his own temporarily up to 400 mg bid.      He states he then weaned down to 200 mg bid and has been on this dose for the past year.  He denies any recent seizures but states in the past he has had times where he would have a tingling or weird feeling in his mouth but would not lose consciousness.  He would have this for a minute or two and note this would occur mainly when he was drinking caffeine more frequently and was  not sleeping well.      Currently, he is doing well with no seizures reported; levels normal as of 11/2024 - continue same dose of lamotrigine 200 mg bid    1. Partial symptomatic epilepsy with complex partial seizures, not intractable, without status epilepticus (HCC)  As noted above   - lamoTRIgine 200 MG Oral Tab; Take 1 tablet (200 mg total) by mouth 2 (two) times daily.  Dispense: 180 tablet; Refill: 3    Return in about 1 year (around 3/26/2026).    Cesar Gagnon MD, Neurology  [facility]  Pager [phone]  3/26/2025

## 2025-05-04 NOTE — PROGRESS NOTES
[facility] Progress Note    HPI  Chief Complaint   Patient presents with    Seizures     F/U, reports no recent events/seizure-like activity/auras     As per initial visit from 11/1/2023,  \"  Mike Don is a 59 year old male who presents to establish care for epilepsy.  Patient has history of seizures since childhood refractory to medical management. He underwent seizure surgery age 20 at [facility] and was on Lamictal up to 800 mg daily in the past btu relatively well controlled in the past on 400 mg AM / 200 mg PM.  He has previously been seen by [facility] neurology, Dr Aaron and Dr. Benoit but presents here to establish care.      He states when he was younger he had surgery with resection of R temporal lobe.  He states he was off seizure medications after surgery at age 20 but had recurrence when he was older.  He was on Dilantin and phenobarbital and changed to lamotrigine (Lamictal) and has been on 400 mg AM / 200 mg nightly in the past. He states he had COVID a year and a half ago and went up on his own temporarily up to 400 mg bid.      He states he then weaned down to 200 mg bid and has been on this dose for the past year.  He denies any recent seizures but states in the past he has had times where he would have a tingling or weird feeling in his mouth but would not lose consciousness.  He would have this for a minute or two and note this would occur mainly when he was drinking caffeine more frequently and was not sleeping well.  He now goes to sleep ~9 AM and wakes up ~4 AM and denies waking in the middle of the night after wetting the bed or biting his tongue.      He does not recall when his last seizure was and has been doing well overall.  He denies balance issues, nausea,  \"       Prior notes as per 3/4/2024. Patient since last visit has remained on Lamictal at 200 mg bid. He denies any side effects or rash, balance issues, double vision or nausea.  He denies episodes of loss of  awareness. He has not had any recent episode of tingling in the mouth or auras of odd tastes, smells or sounds.  He overall is doing well with respect to seizures.        Incidentally, he has tingling in thumb/ wrists in the past few months.  He feels like he has \"carpal tunnel syndrome,\" but denies dropping objects from hands or having pain in neck radiating to hands. He has not been wearing wrist braces recently but was wearing these in the past.  He states he is planning to have EMG for possible carpal tunnel syndrome at [facility] soon.       Patient last seen 3/4/2024.  He remains on Lamictal 200 mg bid and denies  any side effects of rash, balance issues, double vision or nausea.  He denies episodes of loss of awareness. He has not had any recent episode of tingling in the mouth or auras of odd tastes, smells or sounds.  He overall is doing well with respect to seizures.        He had testing for possible carpal tunnel syndrome with EMG done 3/4/2024 which was normal.       Past Medical History:    Allergy    allergies    Cancer (HCC)    melanoma finger at 19 y/o    Chronic tension headaches    Esophageal reflux    Lipid screening    per NG    Seizure disorder (HCC)     Past Surgical History:   Procedure Laterality Date    Brain surgery  1985    Colonoscopy  07/22/2019    repeat 7/2029    Colonoscopy N/A 7/22/2019    Procedure: COLONOSCOPY;  Surgeon: Topher Harper MD;  Location: [facility] ENDOSCOPY    Hand/finger surgery unlisted  2008    Hand/finger surgery unlisted      LRF tendon repair     Family History   Problem Relation Age of Onset    Diabetes Mother     Lipids Mother         Hyperlpidemia    No Known Problems Sister      Social History     Socioeconomic History    Marital status:    Tobacco Use    Smoking status: Never     Passive exposure: Never    Smokeless tobacco: Never   Vaping Use    Vaping status: Never Used   Substance and Sexual Activity    Alcohol use: Yes     Alcohol/week: 5.0  standard drinks of alcohol    Drug use: No   Other Topics Concern    Caffeine Concern Yes     Comment: 1 coke a day    Sleep Concern Yes     Comment: trouble staying asleep.    Exercise Yes     Comment: 3 x week    Reaction to local anesthetic No    Pt has a pacemaker No    Pt has a defibrillator No       No Known Allergies      Current Outpatient Medications:     lamoTRIgine 200 MG Oral Tab, Take 1 tablet (200 mg total) by mouth 2 (two) times daily., Disp: 180 tablet, Rfl: 3    omeprazole 20 MG Oral Capsule Delayed Release, Take 1 capsule (20 mg total) by mouth every morning before breakfast., Disp: , Rfl:     Review of Systems:  No chest pain or palpitations; otherwise as noted in HPI.    Exam:  BP 98/70 (BP Location: Left arm, Patient Position: Sitting, Cuff Size: adult)   Pulse 70   Resp 16   Ht 66\"   Wt 190 lb (86.2 kg)   BMI 30.67 kg/m²   Estimated body mass index is 30.67 kg/m² as calculated from the following:    Height as of this encounter: 66\".    Weight as of this encounter: 190 lb (86.2 kg).    Gen: well developed, well nourished, no acute distress  HEENT: normocephalic  Heart; normal S1/S2, regular rate and rhythm  Lungs: clear to auscultation bilaterally  Extremities: no edema, peripheral pulses intact    Neck: supple, full range of motion; no carotid bruits    Fundoscopic Exam: optic discs sharp bilaterally    Neuro:  Mental status:  Orientation: Alert and oriented to person, place, time  Speech Fluent and conversational    CN: PERRL, EOMI with no nystagmus, VFF, smile symmetric, sensation intact, tongue and palate midline, SCM intact, otherwise, CN 2-12 intact  Motor: 5/5 strength throughout, tone normal  DTR: 2+ symmetric throughout, toes downgoing bilaterally, no clonus  Sensory: intact to light touch throughout  Coord: FNF intact with no tremor or dysmetria; rapid alternating movements intact bilaterally  Romberg: absent  Gait: normal casual, heel, toe and tandem gait    Labs:  None  new    Prior as noted below    Component      Latest Ref Rng 11/3/2023 11/4/2023   WBC      4.0 - 11.0 x10(3) uL  6.3    RBC      4.30 - 5.70 x10(6)uL  5.08    Hemoglobin      13.0 - 17.5 g/dL  15.7    Hematocrit      39.0 - 53.0 %  46.6    MCV      80.0 - 100.0 fL  91.7    MCH      26.0 - 34.0 pg  30.9    MCHC      31.0 - 37.0 g/dL  33.7    RDW-SD      35.1 - 46.3 fL  42.5    RDW      11.0 - 15.0 %  12.6    Platelet Count      150.0 - 450.0 10(3)uL  253.0    Prelim Neutrophil Abs      1.50 - 7.70 x10 (3) uL  3.95    Neutrophils Absolute      1.50 - 7.70 x10(3) uL  3.95    Lymphocytes Absolute      1.00 - 4.00 x10(3) uL  1.46    Monocytes Absolute      0.10 - 1.00 x10(3) uL  0.60    Eosinophils Absolute      0.00 - 0.70 x10(3) uL  0.14    Basophils Absolute      0.00 - 0.20 x10(3) uL  0.06    Immature Granulocyte Absolute      0.00 - 1.00 x10(3) uL  0.04    Neutrophils %      %  63.2    Lymphocytes %      %  23.4    Monocytes %      %  9.6    Eosinophils %      %  2.2    Basophils %      %  1.0    Immature Granulocyte %      %  0.6    Glucose      70 - 99 mg/dL  110 (H)    Sodium      136 - 145 mmol/L  141    Potassium      3.5 - 5.1 mmol/L  4.3    Chloride      98 - 112 mmol/L  106    Carbon Dioxide, Total      21.0 - 32.0 mmol/L  25.0    ANION GAP      0 - 18 mmol/L  10    BUN      9 - 23 mg/dL  15    CREATININE      0.70 - 1.30 mg/dL  1.03    BUN/CREATININE RATIO      10.0 - 20.0   14.6    CALCIUM      8.7 - 10.4 mg/dL  10.0    CALCULATED OSMOLALITY      275 - 295 mOsm/kg  293    EGFR      >=60 mL/min/1.73m2  85    ALT (SGPT)      10 - 49 U/L  37    AST (SGOT)      <=34 U/L  28    ALKALINE PHOSPHATASE      45 - 117 U/L  81    Total Bilirubin      0.3 - 1.2 mg/dL  0.4    PROTEIN, TOTAL      5.7 - 8.2 g/dL  7.4    Albumin      3.2 - 4.8 g/dL  4.5    Globulin      2.8 - 4.4 g/dL  2.9    A/G Ratio      1.0 - 2.0   1.6    Patient Fasting for CMP?  Yes    Lamotrigine Lvl      2.0 - 20.0 ug/mL 7.7        Prior as noted  below    Component      Latest Ref Rng 11/14/2019 12/21/2022   LAMOTRIGINE      3.0 - 15.0 ug/mL 8.4  11.8        Imaging:  No recent pertinent imaging             Impression/ Plan:  Mike Don is a 59 year old male who originally presented 11/1/2023 to establish care for epilepsy.  Patient has history of seizures since childhood refractory to medical management. He underwent seizure surgery age 20 at [facility] and was on Lamictal up to 800 mg daily in the past but relatively well controlled in the past on 400 mg AM / 200 mg PM.  He has previously been seen by [facility] neurology, Dr Aaron and Dr. Benoit but presented here to establish care 11/1/2023.        He states when he was younger he had surgery with resection of R temporal lobe.  He states he was off seizure medications after surgery at age 20 but had recurrence when he was older.  He was on Dilantin and phenobarbital and changed to lamotrigine (Lamictal) and has been on 400 mg AM / 200 mg nightly in the past. He states he had COVID a year and a half ago and went up on his own temporarily up to 400 mg bid.      He states he then weaned down to 200 mg bid and has been on this dose for the past year.  He denies any recent seizures but states in the past he has had times where he would have a tingling or weird feeling in his mouth but would not lose consciousness.  He would have this for a minute or two and note this would occur mainly when he was drinking caffeine more frequently and was not sleeping well.      Currently, he is doing well with no seizures reported; levels normal as of 11/3/2023 - continue same dose of lamotrigine 200 mg bid for now and repeat levels.    1. Partial symptomatic epilepsy with complex partial seizures, not intractable, without status epilepticus (HCC)  As noted above  - Lamotrigine (Lamictal), Serum    Return in about 6 months (around 4/30/2025).    Cesar Gagnon MD, Neurology  [facility]  Pager  [phone]  10/30/2024               Plt 61 on adm. Baseline 90-100s (per OSH pulm and card notes).   Chronic problem for pt; followed by hematologist OSH (Dr. Jaspal Reeder MD ([address] [address]) - [phone]). Pt reports not being on any medications for it. 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   - ctm  - pending records from hematologist office (called on 5/2)

## 2025-06-04 NOTE — PROGRESS NOTES
Diagnosis: Tendinitis of both rotator cuffs (M75.81,M75.82)    Date of Onset: November 2020        Next MD visit: none scheduled  Fall Risk: standard         Precautions: n/a          Medication Changes since last visit?: No    Subjective: Patient reports ease ability for pt to lift  3- Pt will report 1/10 pain or less with LUE motions    Plan: Reassess patient symptoms.      Charges: EX 3       Total Timed Treatment: 45 min  Total Treatment Time: 45 min Never

## 2025-07-10 ENCOUNTER — OFFICE VISIT (OUTPATIENT)
Dept: DERMATOLOGY CLINIC | Facility: CLINIC | Age: 60
End: 2025-07-10
Payer: COMMERCIAL

## 2025-07-10 DIAGNOSIS — Z12.83 SCREENING EXAM FOR SKIN CANCER: Primary | ICD-10-CM

## 2025-07-10 DIAGNOSIS — D22.9 MULTIPLE NEVI: ICD-10-CM

## 2025-07-10 PROCEDURE — 99213 OFFICE O/P EST LOW 20 MIN: CPT | Performed by: PHYSICIAN ASSISTANT

## 2025-07-10 NOTE — PROGRESS NOTES
HPI:    Patient ID: Mike Don is a 59 year old male.    Patient presents for full body skin exam with no areas of concern. Did have hx of melanoma on left 5th digit at age 18. Does try to wear sun screen daily. No draining or tenderness noted. No alelrgies to medications noted.       Review of Systems   Constitutional:  Negative for chills and fever.   Musculoskeletal:  Negative for arthralgias and myalgias.   Skin:  Positive for rash. Negative for color change and wound.          Current Medications[1]  Allergies:Allergies[2]   There were no vitals taken for this visit.  There is no height or weight on file to calculate BMI.  PHYSICAL EXAM:   Physical Exam  Constitutional:       General: He is not in acute distress.     Appearance: Normal appearance.   Skin:     General: Skin is warm and dry.      Findings: Rash present.      Comments: Multiple nevi noted throughout the body. No draining or tenderness noted. No scaling noted. No erythema noted. Dermoscopy shows benign patterns.    Neurological:      Mental Status: He is alert and oriented to person, place, and time.                ASSESSMENT/PLAN:   1. Screening exam for skin cancer  -After discussion with patient, advised the following:  Reassurance regarding other benign skin lesions. Signs and symptoms of skin cancer, ABCDE's of melanoma discussed with patient. Sunscreen use, sun protection, self exams reviewed. Followup as noted RTC ---routine checkup 6 mos -one year or p.r.n.   -Pt was agreeable to plan and will comply with discussion above.       2. Multiple nevi  -After discussion with patient, advised the following:  -Benign lesions  -Observe for now  -Return if there are changes.   -Went over ABCDE changes with the patient.   -Encouraged sun protection with SPF 30 or above.  -To call or follow-up with worsening symptoms or concerns.   -Pt was agreeable to plan and will comply with discussion above.       No orders of the defined types were placed in  this encounter.      Meds This Visit:  Requested Prescriptions      No prescriptions requested or ordered in this encounter       Imaging & Referrals:  None         ID#[id number]         [1]   Current Outpatient Medications   Medication Sig Dispense Refill    lamoTRIgine 200 MG Oral Tab Take 1 tablet (200 mg total) by mouth 2 (two) times daily. 180 tablet 3    omeprazole 20 MG Oral Capsule Delayed Release Take 1 capsule (20 mg total) by mouth every morning before breakfast.     [2] No Known Allergies

## (undated) DEVICE — LINE MNTR ADLT SET O2 INTMD

## (undated) DEVICE — Device: Brand: DEFENDO AIR/WATER/SUCTION AND BIOPSY VALVE

## (undated) DEVICE — SNARE OPTMZ PLPCTM TRP

## (undated) DEVICE — SNARE ENDOSCOPIC 10MM ROUND

## (undated) DEVICE — 35 ML SYRINGE REGULAR TIP: Brand: MONOJECT

## (undated) DEVICE — Device: Brand: CUSTOM PROCEDURE KIT

## (undated) NOTE — LETTER
Date & Time: 11/3/2018, 10:09 AM  Patient: Lamberto Key  Encounter Provider(s):    Sender, Adrian Murphy MD       To Whom It May Concern:    Lamberto Key was seen and treated in our department on 11/3/2018.  He can return to work with these limitations: must

## (undated) NOTE — LETTER
[address] [place] [address]  [phone]        Dear Natalie Page MD,      I  saw your patient, Stacie Saucedo on 1/20/2022.       Below please find a summary of our v

## (undated) NOTE — Clinical Note
Dear Dr. Salgado,  I had the opportunity to see your patient Mike Don for an EMG recently. I appreciate your confidence in me to care for your patients. Please feel free call me with any questions at [phone] or contact me through Epic.  Sincerely, Lewis Waldron MD Board Certified, Physical Medicine and Rehabilitation Specializing in Sports Medicine, Spine Medicine and Electrodiagnostic Medicine [facility]

## (undated) NOTE — LETTER
Date & Time: 7/21/2020, 2:14 PM  Patient: Fantasma Alarcon  Encounter Provider(s):    Juleen Goodpasture, MD       To Whom It May Concern:    Willy Loser was seen and treated in our department on 7/21/2020. He should not return to work until 07/24/2020.

## (undated) NOTE — LETTER
WHERE IS YOUR PAIN NOW?  Kang the areas on your body where you feel the described sensations.  Use the appropriate symbol.  Kang the areas of radiation.  Include all affected areas.  Just to complete the picture, please draw in the face.     ACHE:  ^ ^ ^   NUMBNESS:  0000   PINS & NEEDLES:  = = = =                              ^ ^ ^                       0000              = = = =                                    ^ ^ ^                       0000            = = = =      BURNING:  XXXX   STABBING: ////                  XXXX                ////                         XXXX          ////     Please kang the line below indicating your degree of pain right now  with 0 being no pain 10 being the worst pain possible.                                         0             1             2              3             4              5              6              7             8             9             10         Patient Signature:

## (undated) NOTE — MR AVS SNAPSHOT
[facility]. [address] [place] [address]  [address]  [id number] [phone]               Thank you for choosing us for your health care visit with Bard Cesario MD.  We are glad to serve you and happy to provide you with this summary of yo Gastro-esophageal Reflux           Medical Issues Discussed Today     Gastroesophageal reflux disease, esophagitis presence not specified    -  Primary    Mass in chest        History of melanoma          Instructions and Information about Your Health Water is best for hydration Fast food. Eat at home when possible     Tips for increasing your physical activity – Adults who are physically active are less likely to develop some chronic diseases than adults who are inactive.      HOW TO GET STARTED: HOW

## (undated) NOTE — LETTER
8/6/2018              Mynor Castellano        [address] [facility] [address]         To whom it may concern,      This is to certify that Mr Mynor Castellano was seen and evaluated in our clinic today.  He should be excused from work for the next 3